# Patient Record
Sex: FEMALE | Race: WHITE | NOT HISPANIC OR LATINO
[De-identification: names, ages, dates, MRNs, and addresses within clinical notes are randomized per-mention and may not be internally consistent; named-entity substitution may affect disease eponyms.]

---

## 2017-05-25 ENCOUNTER — APPOINTMENT (OUTPATIENT)
Dept: PULMONOLOGY | Facility: CLINIC | Age: 27
End: 2017-05-25

## 2017-06-02 ENCOUNTER — APPOINTMENT (OUTPATIENT)
Dept: OBGYN | Facility: CLINIC | Age: 27
End: 2017-06-02

## 2018-05-30 ENCOUNTER — APPOINTMENT (OUTPATIENT)
Dept: OBGYN | Facility: CLINIC | Age: 28
End: 2018-05-30
Payer: COMMERCIAL

## 2018-05-30 PROCEDURE — 99395 PREV VISIT EST AGE 18-39: CPT

## 2019-06-06 ENCOUNTER — APPOINTMENT (OUTPATIENT)
Dept: OBGYN | Facility: CLINIC | Age: 29
End: 2019-06-06

## 2021-01-19 ENCOUNTER — FORM ENCOUNTER (OUTPATIENT)
Age: 31
End: 2021-01-19

## 2021-01-19 ENCOUNTER — RESULT REVIEW (OUTPATIENT)
Age: 31
End: 2021-01-19

## 2021-01-20 ENCOUNTER — APPOINTMENT (OUTPATIENT)
Dept: OBGYN | Facility: CLINIC | Age: 31
End: 2021-01-20
Payer: COMMERCIAL

## 2021-01-20 ENCOUNTER — FORM ENCOUNTER (OUTPATIENT)
Age: 31
End: 2021-01-20

## 2021-01-20 ENCOUNTER — TRANSCRIPTION ENCOUNTER (OUTPATIENT)
Age: 31
End: 2021-01-20

## 2021-01-20 PROCEDURE — 99395 PREV VISIT EST AGE 18-39: CPT

## 2021-01-20 PROCEDURE — 99213 OFFICE O/P EST LOW 20 MIN: CPT | Mod: 25

## 2021-01-20 PROCEDURE — 36415 COLL VENOUS BLD VENIPUNCTURE: CPT

## 2021-01-20 PROCEDURE — 76817 TRANSVAGINAL US OBSTETRIC: CPT

## 2021-01-26 ENCOUNTER — FORM ENCOUNTER (OUTPATIENT)
Age: 31
End: 2021-01-26

## 2021-02-09 ENCOUNTER — FORM ENCOUNTER (OUTPATIENT)
Age: 31
End: 2021-02-09

## 2021-02-16 ENCOUNTER — FORM ENCOUNTER (OUTPATIENT)
Age: 31
End: 2021-02-16

## 2021-02-17 ENCOUNTER — APPOINTMENT (OUTPATIENT)
Dept: OBGYN | Facility: CLINIC | Age: 31
End: 2021-02-17
Payer: COMMERCIAL

## 2021-02-17 PROCEDURE — 76813 OB US NUCHAL MEAS 1 GEST: CPT | Mod: 59

## 2021-02-17 PROCEDURE — 0500F INITIAL PRENATAL CARE VISIT: CPT

## 2021-02-17 PROCEDURE — 99072 ADDL SUPL MATRL&STAF TM PHE: CPT

## 2021-02-17 PROCEDURE — 76801 OB US < 14 WKS SINGLE FETUS: CPT

## 2021-02-25 ENCOUNTER — FORM ENCOUNTER (OUTPATIENT)
Age: 31
End: 2021-02-25

## 2021-03-17 ENCOUNTER — APPOINTMENT (OUTPATIENT)
Dept: OBGYN | Facility: CLINIC | Age: 31
End: 2021-03-17
Payer: COMMERCIAL

## 2021-03-17 PROCEDURE — 0502F SUBSEQUENT PRENATAL CARE: CPT

## 2021-03-17 PROCEDURE — 36415 COLL VENOUS BLD VENIPUNCTURE: CPT

## 2021-03-25 ENCOUNTER — FORM ENCOUNTER (OUTPATIENT)
Age: 31
End: 2021-03-25

## 2021-04-06 ENCOUNTER — NON-APPOINTMENT (OUTPATIENT)
Age: 31
End: 2021-04-06

## 2021-04-12 ENCOUNTER — ASOB RESULT (OUTPATIENT)
Age: 31
End: 2021-04-12

## 2021-04-12 ENCOUNTER — APPOINTMENT (OUTPATIENT)
Dept: ANTEPARTUM | Facility: CLINIC | Age: 31
End: 2021-04-12
Payer: COMMERCIAL

## 2021-04-12 PROCEDURE — 99072 ADDL SUPL MATRL&STAF TM PHE: CPT

## 2021-04-12 PROCEDURE — 76811 OB US DETAILED SNGL FETUS: CPT

## 2021-04-15 ENCOUNTER — APPOINTMENT (OUTPATIENT)
Dept: OBGYN | Facility: CLINIC | Age: 31
End: 2021-04-15
Payer: COMMERCIAL

## 2021-04-15 PROCEDURE — 0502F SUBSEQUENT PRENATAL CARE: CPT

## 2021-04-25 ENCOUNTER — FORM ENCOUNTER (OUTPATIENT)
Age: 31
End: 2021-04-25

## 2021-05-26 ENCOUNTER — APPOINTMENT (OUTPATIENT)
Dept: OBGYN | Facility: CLINIC | Age: 31
End: 2021-05-26
Payer: COMMERCIAL

## 2021-05-26 PROCEDURE — 0502F SUBSEQUENT PRENATAL CARE: CPT

## 2021-05-26 PROCEDURE — 36415 COLL VENOUS BLD VENIPUNCTURE: CPT

## 2021-05-26 PROCEDURE — 99072 ADDL SUPL MATRL&STAF TM PHE: CPT

## 2021-05-26 PROCEDURE — 76816 OB US FOLLOW-UP PER FETUS: CPT

## 2021-06-23 ENCOUNTER — APPOINTMENT (OUTPATIENT)
Dept: OBGYN | Facility: CLINIC | Age: 31
End: 2021-06-23
Payer: COMMERCIAL

## 2021-06-23 PROCEDURE — 0502F SUBSEQUENT PRENATAL CARE: CPT

## 2021-06-30 ENCOUNTER — OUTPATIENT (OUTPATIENT)
Dept: OUTPATIENT SERVICES | Facility: HOSPITAL | Age: 31
LOS: 1 days | End: 2021-06-30
Payer: COMMERCIAL

## 2021-06-30 ENCOUNTER — APPOINTMENT (OUTPATIENT)
Dept: OBGYN | Facility: CLINIC | Age: 31
End: 2021-06-30
Payer: COMMERCIAL

## 2021-06-30 VITALS
OXYGEN SATURATION: 96 % | SYSTOLIC BLOOD PRESSURE: 106 MMHG | TEMPERATURE: 99 F | RESPIRATION RATE: 96 BRPM | HEART RATE: 82 BPM | DIASTOLIC BLOOD PRESSURE: 65 MMHG

## 2021-06-30 VITALS — SYSTOLIC BLOOD PRESSURE: 105 MMHG | DIASTOLIC BLOOD PRESSURE: 61 MMHG | HEART RATE: 86 BPM | TEMPERATURE: 99 F

## 2021-06-30 DIAGNOSIS — Z3A.00 WEEKS OF GESTATION OF PREGNANCY NOT SPECIFIED: ICD-10-CM

## 2021-06-30 DIAGNOSIS — O26.899 OTHER SPECIFIED PREGNANCY RELATED CONDITIONS, UNSPECIFIED TRIMESTER: ICD-10-CM

## 2021-06-30 PROCEDURE — G0463: CPT

## 2021-06-30 PROCEDURE — 59025 FETAL NON-STRESS TEST: CPT | Mod: 26

## 2021-06-30 PROCEDURE — 0502F SUBSEQUENT PRENATAL CARE: CPT

## 2021-06-30 NOTE — OB PROVIDER TRIAGE NOTE - HISTORY OF PRESENT ILLNESS
R3 OB Triage Note     NELI LOWERY is a 30y/o  at 31w5d who presented for evaluation following 2D of decreased fetal movement and self recorded periods of tachycardia (up to 150) at home while at rest. Pt denies any LOF, VB, or ctx. She denies any fever/chills, n/v, SOB, HA, or RUQ pain.     course complications:  - Tachycardia: intermittent tachycardia up to 150 at home while at rest, pt referred to a cardiologist and will scheduled an appt tomorrow morning    OBGYNHx:  -   - Denies any fibroid/cysts/adenomyosis/endometriosis/STIs/pap     PMHx:   - Asthma: allergy & exercise induced, no inhaler use during pregnancy     SurgHx: denies    PSHx:  - Lives w/    - Denies Alcohol/Tobacco/Illicit drug use     Meds:     All: NKDA    VS:  T(C): 37.0 (21 @ 22:47), Max: 37 (21 @ 21:14)  HR: 86 (21 @ 22:47) (76 - 104)  BP: 105/61 (21 @ 22:47) (105/61 - 106/65)  RR: 96 (21 @ 21:14) (96 - 96)  SpO2: 97% (21 @ 22:46) (94% - 97%)    Physical Exam   GEN: well appearing, in no acute distress  Cards: RRR  Pulm: CTAB  Abd: gravid, nontender      EFH: Cat I, 140  [place]: no ctx  TAUS: transverse

## 2021-06-30 NOTE — OB PROVIDER TRIAGE NOTE - NSOBPROVIDERNOTE_OBGYN_ALL_OB_FT
NELI LOWERY is a 32y/o  at 31w5d who presented complaining of decreased fetal movement and self recorded periods of tachycardia while at rest. Pt denies any LOF, VB, or ctx. She denies any fever/chills, n/v, SOB, HA, or RUQ pain. ROS currently unremarkable VSS with HR wnl and BPP 8/8 with a Cat I NST. Pt was advised to follow up with her primary obgyn for her routine appts and cardiology for outpt workup of intermittent periods of asymptomatic tachycardia. Pt was given precautions fo when to return to care for evaluation (DFM, LOF, VB, or painful ctx, etc prn).    Pt discussed with Dr. Moscoso.    lEyse Cary MD  OBGYN PGY3

## 2021-07-13 ENCOUNTER — NON-APPOINTMENT (OUTPATIENT)
Age: 31
End: 2021-07-13

## 2021-07-13 ENCOUNTER — APPOINTMENT (OUTPATIENT)
Dept: CV DIAGNOSITCS | Facility: HOSPITAL | Age: 31
End: 2021-07-13

## 2021-07-13 ENCOUNTER — APPOINTMENT (OUTPATIENT)
Dept: CARDIOLOGY | Facility: CLINIC | Age: 31
End: 2021-07-13
Payer: COMMERCIAL

## 2021-07-13 ENCOUNTER — APPOINTMENT (OUTPATIENT)
Dept: ELECTROPHYSIOLOGY | Facility: CLINIC | Age: 31
End: 2021-07-13
Payer: COMMERCIAL

## 2021-07-13 ENCOUNTER — OUTPATIENT (OUTPATIENT)
Dept: OUTPATIENT SERVICES | Facility: HOSPITAL | Age: 31
LOS: 1 days | End: 2021-07-13
Payer: COMMERCIAL

## 2021-07-13 VITALS
OXYGEN SATURATION: 96 % | SYSTOLIC BLOOD PRESSURE: 107 MMHG | BODY MASS INDEX: 29.63 KG/M2 | WEIGHT: 161 LBS | HEART RATE: 69 BPM | DIASTOLIC BLOOD PRESSURE: 66 MMHG | HEIGHT: 62 IN

## 2021-07-13 DIAGNOSIS — Z00.00 ENCOUNTER FOR GENERAL ADULT MEDICAL EXAMINATION WITHOUT ABNORMAL FINDINGS: ICD-10-CM

## 2021-07-13 DIAGNOSIS — Z83.3 FAMILY HISTORY OF DIABETES MELLITUS: ICD-10-CM

## 2021-07-13 DIAGNOSIS — Z78.9 OTHER SPECIFIED HEALTH STATUS: ICD-10-CM

## 2021-07-13 PROCEDURE — 99072 ADDL SUPL MATRL&STAF TM PHE: CPT

## 2021-07-13 PROCEDURE — 99244 OFF/OP CNSLTJ NEW/EST MOD 40: CPT

## 2021-07-13 PROCEDURE — 93306 TTE W/DOPPLER COMPLETE: CPT

## 2021-07-13 PROCEDURE — 93000 ELECTROCARDIOGRAM COMPLETE: CPT

## 2021-07-13 PROCEDURE — 93242 EXT ECG>48HR<7D RECORDING: CPT

## 2021-07-13 PROCEDURE — 93306 TTE W/DOPPLER COMPLETE: CPT | Mod: 26

## 2021-07-13 RX ORDER — MONTELUKAST SODIUM 10 MG/1
10 TABLET, FILM COATED ORAL
Refills: 0 | Status: ACTIVE | COMMUNITY

## 2021-07-14 ENCOUNTER — APPOINTMENT (OUTPATIENT)
Dept: OBGYN | Facility: CLINIC | Age: 31
End: 2021-07-14
Payer: COMMERCIAL

## 2021-07-14 PROCEDURE — 76819 FETAL BIOPHYS PROFIL W/O NST: CPT

## 2021-07-14 PROCEDURE — 0502F SUBSEQUENT PRENATAL CARE: CPT

## 2021-07-14 PROCEDURE — 76816 OB US FOLLOW-UP PER FETUS: CPT

## 2021-07-14 PROCEDURE — 99072 ADDL SUPL MATRL&STAF TM PHE: CPT

## 2021-07-16 ENCOUNTER — FORM ENCOUNTER (OUTPATIENT)
Age: 31
End: 2021-07-16

## 2021-07-18 NOTE — REVIEW OF SYSTEMS
[Palpitations] : palpitations [Negative] : Heme/Lymph [SOB] : no shortness of breath [Dyspnea on exertion] : not dyspnea during exertion [Chest Discomfort] : no chest discomfort [Lower Ext Edema] : no extremity edema [Leg Claudication] : no intermittent leg claudication [Syncope] : no syncope

## 2021-07-18 NOTE — HISTORY OF PRESENT ILLNESS
[FreeTextEntry1] : Jesenia is a 31-year-old female 33 weeks first pregnancy with palpitations. She works as PT and happens more at work. Lasts a few seconds and resolves spontaneously. No lightheadedness or dizziness.

## 2021-07-18 NOTE — DISCUSSION/SUMMARY
[FreeTextEntry1] : The patient is a 31-year-old female 33 weeks gestation with palpitations unrelated to activity.\par - normal ECG\par - ECHO normal\par - event monitor placed\par - recommend hydration and rest when fatigued\par - does not appear to be related to meals

## 2021-07-28 ENCOUNTER — APPOINTMENT (OUTPATIENT)
Dept: OBGYN | Facility: CLINIC | Age: 31
End: 2021-07-28
Payer: COMMERCIAL

## 2021-07-28 PROCEDURE — 0502F SUBSEQUENT PRENATAL CARE: CPT

## 2021-08-05 ENCOUNTER — APPOINTMENT (OUTPATIENT)
Dept: OBGYN | Facility: CLINIC | Age: 31
End: 2021-08-05
Payer: COMMERCIAL

## 2021-08-05 ENCOUNTER — FORM ENCOUNTER (OUTPATIENT)
Age: 31
End: 2021-08-05

## 2021-08-05 PROCEDURE — 0502F SUBSEQUENT PRENATAL CARE: CPT

## 2021-08-08 PROCEDURE — 93244 EXT ECG>48HR<7D REV&INTERPJ: CPT

## 2021-08-12 ENCOUNTER — APPOINTMENT (OUTPATIENT)
Dept: OBGYN | Facility: CLINIC | Age: 31
End: 2021-08-12
Payer: COMMERCIAL

## 2021-08-12 PROCEDURE — 0502F SUBSEQUENT PRENATAL CARE: CPT

## 2021-08-17 ENCOUNTER — FORM ENCOUNTER (OUTPATIENT)
Age: 31
End: 2021-08-17

## 2021-08-18 ENCOUNTER — APPOINTMENT (OUTPATIENT)
Dept: OBGYN | Facility: CLINIC | Age: 31
End: 2021-08-18
Payer: COMMERCIAL

## 2021-08-18 PROCEDURE — 0502F SUBSEQUENT PRENATAL CARE: CPT

## 2021-08-24 ENCOUNTER — FORM ENCOUNTER (OUTPATIENT)
Age: 31
End: 2021-08-24

## 2021-08-25 ENCOUNTER — APPOINTMENT (OUTPATIENT)
Dept: OBGYN | Facility: CLINIC | Age: 31
End: 2021-08-25
Payer: COMMERCIAL

## 2021-08-25 PROCEDURE — 0502F SUBSEQUENT PRENATAL CARE: CPT

## 2021-08-25 PROCEDURE — 76818 FETAL BIOPHYS PROFILE W/NST: CPT

## 2021-08-25 PROCEDURE — 76816 OB US FOLLOW-UP PER FETUS: CPT

## 2021-08-31 ENCOUNTER — OUTPATIENT (OUTPATIENT)
Dept: OUTPATIENT SERVICES | Facility: HOSPITAL | Age: 31
LOS: 1 days | End: 2021-08-31
Payer: COMMERCIAL

## 2021-08-31 ENCOUNTER — FORM ENCOUNTER (OUTPATIENT)
Age: 31
End: 2021-08-31

## 2021-08-31 DIAGNOSIS — Z11.52 ENCOUNTER FOR SCREENING FOR COVID-19: ICD-10-CM

## 2021-08-31 LAB — SARS-COV-2 RNA SPEC QL NAA+PROBE: SIGNIFICANT CHANGE UP

## 2021-08-31 PROCEDURE — C9803: CPT

## 2021-08-31 PROCEDURE — U0005: CPT

## 2021-08-31 PROCEDURE — U0003: CPT

## 2021-09-02 ENCOUNTER — INPATIENT (INPATIENT)
Facility: HOSPITAL | Age: 31
LOS: 1 days | Discharge: HOME | End: 2021-09-04
Attending: OBSTETRICS & GYNECOLOGY | Admitting: OBSTETRICS & GYNECOLOGY
Payer: COMMERCIAL

## 2021-09-02 VITALS — HEIGHT: 62 IN

## 2021-09-02 DIAGNOSIS — O48.0 POST-TERM PREGNANCY: ICD-10-CM

## 2021-09-02 LAB
BASOPHILS # BLD AUTO: 0.03 K/UL — SIGNIFICANT CHANGE UP (ref 0–0.2)
BASOPHILS NFR BLD AUTO: 0.3 % — SIGNIFICANT CHANGE UP (ref 0–2)
BLD GP AB SCN SERPL QL: NEGATIVE — SIGNIFICANT CHANGE UP
EOSINOPHIL # BLD AUTO: 0.11 K/UL — SIGNIFICANT CHANGE UP (ref 0–0.5)
EOSINOPHIL NFR BLD AUTO: 1.1 % — SIGNIFICANT CHANGE UP (ref 0–6)
HCT VFR BLD CALC: 36.4 % — SIGNIFICANT CHANGE UP (ref 34.5–45)
HGB BLD-MCNC: 12.1 G/DL — SIGNIFICANT CHANGE UP (ref 11.5–15.5)
IMM GRANULOCYTES NFR BLD AUTO: 0.7 % — SIGNIFICANT CHANGE UP (ref 0–1.5)
LYMPHOCYTES # BLD AUTO: 2.08 K/UL — SIGNIFICANT CHANGE UP (ref 1–3.3)
LYMPHOCYTES # BLD AUTO: 20.2 % — SIGNIFICANT CHANGE UP (ref 13–44)
MCHC RBC-ENTMCNC: 31.3 PG — SIGNIFICANT CHANGE UP (ref 27–34)
MCHC RBC-ENTMCNC: 33.2 GM/DL — SIGNIFICANT CHANGE UP (ref 32–36)
MCV RBC AUTO: 94.3 FL — SIGNIFICANT CHANGE UP (ref 80–100)
MONOCYTES # BLD AUTO: 0.81 K/UL — SIGNIFICANT CHANGE UP (ref 0–0.9)
MONOCYTES NFR BLD AUTO: 7.9 % — SIGNIFICANT CHANGE UP (ref 2–14)
NEUTROPHILS # BLD AUTO: 7.21 K/UL — SIGNIFICANT CHANGE UP (ref 1.8–7.4)
NEUTROPHILS NFR BLD AUTO: 69.8 % — SIGNIFICANT CHANGE UP (ref 43–77)
NRBC # BLD: 0 /100 WBCS — SIGNIFICANT CHANGE UP (ref 0–0)
PLATELET # BLD AUTO: 165 K/UL — SIGNIFICANT CHANGE UP (ref 150–400)
RBC # BLD: 3.86 M/UL — SIGNIFICANT CHANGE UP (ref 3.8–5.2)
RBC # FLD: 13.6 % — SIGNIFICANT CHANGE UP (ref 10.3–14.5)
RH IG SCN BLD-IMP: POSITIVE — SIGNIFICANT CHANGE UP
RH IG SCN BLD-IMP: POSITIVE — SIGNIFICANT CHANGE UP
WBC # BLD: 10.31 K/UL — SIGNIFICANT CHANGE UP (ref 3.8–10.5)
WBC # FLD AUTO: 10.31 K/UL — SIGNIFICANT CHANGE UP (ref 3.8–10.5)

## 2021-09-02 RX ORDER — SODIUM CHLORIDE 9 MG/ML
1000 INJECTION, SOLUTION INTRAVENOUS
Refills: 0 | Status: DISCONTINUED | OUTPATIENT
Start: 2021-09-02 | End: 2021-09-03

## 2021-09-02 RX ORDER — CITRIC ACID/SODIUM CITRATE 300-500 MG
15 SOLUTION, ORAL ORAL EVERY 6 HOURS
Refills: 0 | Status: DISCONTINUED | OUTPATIENT
Start: 2021-09-02 | End: 2021-09-03

## 2021-09-02 RX ORDER — INFLUENZA VIRUS VACCINE 15; 15; 15; 15 UG/.5ML; UG/.5ML; UG/.5ML; UG/.5ML
0.5 SUSPENSION INTRAMUSCULAR ONCE
Refills: 0 | Status: DISCONTINUED | OUTPATIENT
Start: 2021-09-02 | End: 2021-09-04

## 2021-09-02 RX ORDER — OXYTOCIN 10 UNIT/ML
333.33 VIAL (ML) INJECTION
Qty: 20 | Refills: 0 | Status: DISCONTINUED | OUTPATIENT
Start: 2021-09-02 | End: 2021-09-04

## 2021-09-02 RX ADMIN — SODIUM CHLORIDE 125 MILLILITER(S): 9 INJECTION, SOLUTION INTRAVENOUS at 22:55

## 2021-09-02 NOTE — OB PROVIDER IHI INDUCTION/AUGMENTATION NOTE - NS_IHIMETHOD_OBGYN_ALL_OB
Cytotec Vaginal/CRB, Cervical Ripening Balloon Pitocin/Cytotec Vaginal/CRB, Cervical Ripening Balloon

## 2021-09-02 NOTE — OB PROVIDER H&P - ASSESSMENT
30yo  with uncomplicated PNC here for IOL for post dates. Will admit to L&D for vaginal cytotec and cervical balloon.    Plan  - Admit to LND. Routine Labs. IVF.  - IOL w/ VC and CB  - Fetus: cat 1 tracing. VTX. EFW g by sono. Continuous EFM. No concerns.  - Prenatal issues: none  - GBS neg, abx not required  - Pain: epidural PRN    Patient discussed with attending physician, Dr. Key.      PATTY Enamorado, PGY1 32yo  with uncomplicated PNC here for IOL for post dates. Will admit to L&D for vaginal cytotec and cervical balloon.    Plan  - Admit to LND. Routine Labs. IVF.  - IOL w/ VC and CB  - Fetus: cat 1 tracing. VTX. EFW extrapolated to 3432g. Continuous EFM. No concerns.  - Prenatal issues: none  - GBS neg, abx not required  - Pain: epidural PRN    Patient discussed with attending physician, Dr. Key.      PATTY Enamorado, PGY1

## 2021-09-02 NOTE — OB PROVIDER H&P - NSHPLABSRESULTS_GEN_ALL_CORE
T(C): 36.6 (09-02-21 @ 21:50), Max: 36.6 (09-02-21 @ 21:44)  HR: 74 (09-02-21 @ 22:49) (70 - 94)  BP: 113/65 (09-02-21 @ 21:50) (113/65 - 113/65)  RR: 18 (09-02-21 @ 21:50) (18 - 18)  SpO2: 99% (09-02-21 @ 22:49) (99% - 100%)

## 2021-09-02 NOTE — OB PROVIDER H&P - HISTORY OF PRESENT ILLNESS
HPI: 32yo F  @40+6  presents for IOL for post dates. Patient reports cramping but not discrete contractions. +FM. -LOF. -VB. Pt denies any other concerns.    PNC: Denies prenatal issues. GBS neg.  EFW 3232g by sono last week.    OBHx: primigravida  GynHx: denies hx STIs, fibroids, polyps, cysts  PMH: Asthma-never hospitalized, intubated, or on steroids. Denies hx clotting or bleeding disorders, HTN, DM  PSH: denies  PFH: no hx congential disorders, bleeding/clotting disorders  Psych: denies   Social: denies etoh, smoking, drugs. Safe at home/in relationship.  Meds: PNV, Montelukast 10mg, albuterol PRN-last used before pregnancy  Allergies: NKDA  Will accept blood transfusions? Yes

## 2021-09-02 NOTE — PRE-ANESTHESIA EVALUATION ADULT - NSANTHPMHFT_GEN_ALL_CORE
39 weeks gestation; tachycardia ; palpitations;  asthma; cardiology consult 7/2021: nl EKG and nl echo 40.6 weeks gestation; tachycardia ; palpitations;  asthma; cardiology consult 7/2021: nl EKG and nl echo

## 2021-09-02 NOTE — OB PROVIDER H&P - NSHPPHYSICALEXAM_GEN_ALL_CORE
Gen: NAD  CV: RRR  Pulm: breathing comfortably on RA  Abd: gravid, nontender  Extr: moving all extremities with ease  – VE: 2/70/-3  – FHT Cat I: baseline 135, mod variability, +accels, -decels  – Rand: Irregular  – Sono: vertex

## 2021-09-03 LAB
COVID-19 SPIKE DOMAIN AB INTERP: NEGATIVE — SIGNIFICANT CHANGE UP
COVID-19 SPIKE DOMAIN ANTIBODY RESULT: 0.4 U/ML — SIGNIFICANT CHANGE UP
SARS-COV-2 IGG+IGM SERPL QL IA: 0.4 U/ML — SIGNIFICANT CHANGE UP
SARS-COV-2 IGG+IGM SERPL QL IA: NEGATIVE — SIGNIFICANT CHANGE UP
T PALLIDUM AB TITR SER: NEGATIVE — SIGNIFICANT CHANGE UP

## 2021-09-03 PROCEDURE — 59400 OBSTETRICAL CARE: CPT

## 2021-09-03 RX ORDER — BENZOCAINE 10 %
1 GEL (GRAM) MUCOUS MEMBRANE EVERY 6 HOURS
Refills: 0 | Status: DISCONTINUED | OUTPATIENT
Start: 2021-09-03 | End: 2021-09-04

## 2021-09-03 RX ORDER — OXYTOCIN 10 UNIT/ML
4 VIAL (ML) INJECTION
Qty: 30 | Refills: 0 | Status: DISCONTINUED | OUTPATIENT
Start: 2021-09-03 | End: 2021-09-03

## 2021-09-03 RX ORDER — SODIUM CHLORIDE 9 MG/ML
3 INJECTION INTRAMUSCULAR; INTRAVENOUS; SUBCUTANEOUS EVERY 8 HOURS
Refills: 0 | Status: DISCONTINUED | OUTPATIENT
Start: 2021-09-03 | End: 2021-09-04

## 2021-09-03 RX ORDER — SIMETHICONE 80 MG/1
80 TABLET, CHEWABLE ORAL EVERY 4 HOURS
Refills: 0 | Status: DISCONTINUED | OUTPATIENT
Start: 2021-09-03 | End: 2021-09-04

## 2021-09-03 RX ORDER — OXYCODONE HYDROCHLORIDE 5 MG/1
5 TABLET ORAL
Refills: 0 | Status: DISCONTINUED | OUTPATIENT
Start: 2021-09-03 | End: 2021-09-04

## 2021-09-03 RX ORDER — DIPHENHYDRAMINE HCL 50 MG
25 CAPSULE ORAL EVERY 6 HOURS
Refills: 0 | Status: DISCONTINUED | OUTPATIENT
Start: 2021-09-03 | End: 2021-09-04

## 2021-09-03 RX ORDER — HYDROCORTISONE 1 %
1 OINTMENT (GRAM) TOPICAL EVERY 6 HOURS
Refills: 0 | Status: DISCONTINUED | OUTPATIENT
Start: 2021-09-03 | End: 2021-09-04

## 2021-09-03 RX ORDER — KETOROLAC TROMETHAMINE 30 MG/ML
30 SYRINGE (ML) INJECTION ONCE
Refills: 0 | Status: DISCONTINUED | OUTPATIENT
Start: 2021-09-03 | End: 2021-09-03

## 2021-09-03 RX ORDER — TETANUS TOXOID, REDUCED DIPHTHERIA TOXOID AND ACELLULAR PERTUSSIS VACCINE, ADSORBED 5; 2.5; 8; 8; 2.5 [IU]/.5ML; [IU]/.5ML; UG/.5ML; UG/.5ML; UG/.5ML
0.5 SUSPENSION INTRAMUSCULAR ONCE
Refills: 0 | Status: DISCONTINUED | OUTPATIENT
Start: 2021-09-03 | End: 2021-09-04

## 2021-09-03 RX ORDER — OXYTOCIN 10 UNIT/ML
333.33 VIAL (ML) INJECTION
Qty: 20 | Refills: 0 | Status: DISCONTINUED | OUTPATIENT
Start: 2021-09-03 | End: 2021-09-04

## 2021-09-03 RX ORDER — OXYCODONE HYDROCHLORIDE 5 MG/1
5 TABLET ORAL ONCE
Refills: 0 | Status: DISCONTINUED | OUTPATIENT
Start: 2021-09-03 | End: 2021-09-04

## 2021-09-03 RX ORDER — PRAMOXINE HYDROCHLORIDE 150 MG/15G
1 AEROSOL, FOAM RECTAL EVERY 4 HOURS
Refills: 0 | Status: DISCONTINUED | OUTPATIENT
Start: 2021-09-03 | End: 2021-09-04

## 2021-09-03 RX ORDER — AER TRAVELER 0.5 G/1
1 SOLUTION RECTAL; TOPICAL EVERY 4 HOURS
Refills: 0 | Status: DISCONTINUED | OUTPATIENT
Start: 2021-09-03 | End: 2021-09-04

## 2021-09-03 RX ORDER — MAGNESIUM HYDROXIDE 400 MG/1
30 TABLET, CHEWABLE ORAL
Refills: 0 | Status: DISCONTINUED | OUTPATIENT
Start: 2021-09-03 | End: 2021-09-04

## 2021-09-03 RX ORDER — LANOLIN
1 OINTMENT (GRAM) TOPICAL EVERY 6 HOURS
Refills: 0 | Status: DISCONTINUED | OUTPATIENT
Start: 2021-09-03 | End: 2021-09-04

## 2021-09-03 RX ORDER — OXYTOCIN 10 UNIT/ML
2 VIAL (ML) INJECTION
Qty: 30 | Refills: 0 | Status: DISCONTINUED | OUTPATIENT
Start: 2021-09-03 | End: 2021-09-04

## 2021-09-03 RX ORDER — DIBUCAINE 1 %
1 OINTMENT (GRAM) RECTAL EVERY 6 HOURS
Refills: 0 | Status: DISCONTINUED | OUTPATIENT
Start: 2021-09-03 | End: 2021-09-04

## 2021-09-03 RX ORDER — DIPHENHYDRAMINE HCL 50 MG
25 CAPSULE ORAL EVERY 6 HOURS
Refills: 0 | Status: COMPLETED | OUTPATIENT
Start: 2021-09-03 | End: 2022-08-02

## 2021-09-03 RX ORDER — IBUPROFEN 200 MG
600 TABLET ORAL EVERY 6 HOURS
Refills: 0 | Status: COMPLETED | OUTPATIENT
Start: 2021-09-03 | End: 2022-08-02

## 2021-09-03 RX ORDER — IBUPROFEN 200 MG
600 TABLET ORAL EVERY 6 HOURS
Refills: 0 | Status: DISCONTINUED | OUTPATIENT
Start: 2021-09-03 | End: 2021-09-04

## 2021-09-03 RX ORDER — ACETAMINOPHEN 500 MG
975 TABLET ORAL
Refills: 0 | Status: DISCONTINUED | OUTPATIENT
Start: 2021-09-03 | End: 2021-09-04

## 2021-09-03 RX ADMIN — Medication 30 MILLIGRAM(S): at 14:57

## 2021-09-03 RX ADMIN — Medication 975 MILLIGRAM(S): at 20:45

## 2021-09-03 RX ADMIN — Medication 975 MILLIGRAM(S): at 21:15

## 2021-09-03 NOTE — PRE-ANESTHESIA EVALUATION ADULT - NSANTHOSAYNRD_GEN_A_CORE
No. SUZIE screening performed.  STOP BANG Legend: 0-2 = LOW Risk; 3-4 = INTERMEDIATE Risk; 5-8 = HIGH Risk
No. SUZIE screening performed.  STOP BANG Legend: 0-2 = LOW Risk; 3-4 = INTERMEDIATE Risk; 5-8 = HIGH Risk

## 2021-09-03 NOTE — OB PROVIDER LABOR PROGRESS NOTE - NS_SUBJECTIVE/OBJECTIVE_OBGYN_ALL_OB_FT
OB PA Progress Note    Pt seen and evaluated for AROM.   AROM, clear fluid.    Exam  VSS  SVE 9/100/0  EFM 130bpm, moderate variability, +accels, occasional variable/late decelt  [place]: Q2-4min
AT bedside for VE and to place vaginal cytotec. She is s/p epidural. CB still in place during examination. Vaginal cytotec placed without complication
pt seen for placement of vaginal cytotec
patient reevaluated, occ lates noted on exam, maternal position changed and FHR resolved
c/o rectal pressure
Patient comfortable. VC#1 and CB placed with RN in room.

## 2021-09-03 NOTE — OB RN DELIVERY SUMMARY - NS_SEPSISRSKCALC_OBGYN_ALL_OB_FT
EOS calculated successfully. EOS Risk Factor: 0.5/1000 live births ([facility] national incidence); GA=41w;Temp=98.6; ROM=1.317; GBS='Negative'; Antibiotics='No antibiotics or any antibiotics < 2 hrs prior to birth'

## 2021-09-03 NOTE — PRE-ANESTHESIA EVALUATION ADULT - NSANTHPMHFT_GEN_ALL_CORE
30yo F  @40+6  presents for IOL for post dates. Patient reports cramping but not discrete contractions. +FM. -LOF. -VB. Pt denies any other concerns.

## 2021-09-03 NOTE — OB PROVIDER LABOR PROGRESS NOTE - NS_OBIHIFHRDETAILS_OBGYN_ALL_OB_FT
baseline 130, mod scot, +accels, no decels
125, mod scot, +accels, no decels
145, mod varaibility, +acels
Cat I: 130/mod variability/+ accels/- decels
cat 1 tracing

## 2021-09-03 NOTE — OB PROVIDER LABOR PROGRESS NOTE - ASSESSMENT
-cervical balloon in place  -vaginal cytotec #2 placed  -pt comfortable  -category 1 tracing    MGreenman PGY3
32yo  @40+6 here for IOL for LT s/p VC#1 and CB.    - FHT Cat I, continue to monitor  - Expect     PATTY Enamorado, PGY1
expect   will push with urge    Magalys Keith MD
A/P:  - anticipate   - EFM: Cat II, moderate variability, overall reassuring  - For pitocin augmentation PRN  - Dr. Keith in house, aware    Paulina Arzate PA-C
LT IOL with reassuring fetal status    -c/w CB  -pitocin in 3hours    D/w Dr. Sagar Saenz PGY-4
P0 IOL for PD, reevaluated, CB removed, will cont to monitor will start low dose pitocin if ctx space out  discussed risks of IOL, vaginal delivery, operative delivery, possible cesearan section, all questions answered, willing to accept blood transfusion    Magalys Keith MD
No

## 2021-09-03 NOTE — OB RN DELIVERY SUMMARY - NSSELHIDDEN_OBGYN_ALL_OB_FT
[NS_DeliveryAttending1_OBGYN_ALL_OB_FT:[email or web address]=],[NS_DeliveryRN_OBGYN_ALL_OB_FT:[id number]==]

## 2021-09-04 ENCOUNTER — TRANSCRIPTION ENCOUNTER (OUTPATIENT)
Age: 31
End: 2021-09-04

## 2021-09-04 VITALS
OXYGEN SATURATION: 97 % | TEMPERATURE: 98 F | SYSTOLIC BLOOD PRESSURE: 101 MMHG | RESPIRATION RATE: 18 BRPM | DIASTOLIC BLOOD PRESSURE: 63 MMHG | HEART RATE: 92 BPM

## 2021-09-04 PROCEDURE — 59025 FETAL NON-STRESS TEST: CPT

## 2021-09-04 PROCEDURE — 59050 FETAL MONITOR W/REPORT: CPT

## 2021-09-04 PROCEDURE — 86769 SARS-COV-2 COVID-19 ANTIBODY: CPT

## 2021-09-04 PROCEDURE — 86850 RBC ANTIBODY SCREEN: CPT

## 2021-09-04 PROCEDURE — 85025 COMPLETE CBC W/AUTO DIFF WBC: CPT

## 2021-09-04 PROCEDURE — 86900 BLOOD TYPING SEROLOGIC ABO: CPT

## 2021-09-04 PROCEDURE — 86901 BLOOD TYPING SEROLOGIC RH(D): CPT

## 2021-09-04 PROCEDURE — 86780 TREPONEMA PALLIDUM: CPT

## 2021-09-04 RX ORDER — IBUPROFEN 200 MG
1 TABLET ORAL
Qty: 0 | Refills: 0 | DISCHARGE
Start: 2021-09-04

## 2021-09-04 RX ORDER — ACETAMINOPHEN 500 MG
3 TABLET ORAL
Qty: 0 | Refills: 0 | DISCHARGE
Start: 2021-09-04

## 2021-09-04 RX ORDER — ALBUTEROL 90 UG/1
2 AEROSOL, METERED ORAL EVERY 6 HOURS
Refills: 0 | Status: DISCONTINUED | OUTPATIENT
Start: 2021-09-04 | End: 2021-09-04

## 2021-09-04 RX ADMIN — Medication 975 MILLIGRAM(S): at 19:00

## 2021-09-04 RX ADMIN — Medication 600 MILLIGRAM(S): at 08:30

## 2021-09-04 RX ADMIN — Medication 975 MILLIGRAM(S): at 18:20

## 2021-09-04 RX ADMIN — Medication 975 MILLIGRAM(S): at 03:03

## 2021-09-04 RX ADMIN — Medication 600 MILLIGRAM(S): at 15:45

## 2021-09-04 RX ADMIN — Medication 600 MILLIGRAM(S): at 07:52

## 2021-09-04 RX ADMIN — Medication 600 MILLIGRAM(S): at 15:09

## 2021-09-04 RX ADMIN — Medication 1 TABLET(S): at 12:06

## 2021-09-04 NOTE — DISCHARGE NOTE OB - CARE PLAN
Principal Discharge DX:	Vaginal delivery  Assessment and plan of treatment:	Make your follow-up appointment with your doctor as ordered.   No heavy lifting, driving, or strenuous activity for 6 weeks.  Nothing per vagina such as tampons, intercourse, douches or tub baths for 6 weeks or until you see your doctor.  Call your doctor if you're unable to tolerate food, increase in vaginal bleeding, or have difficulty urinating. Call your doctor if you have pain that is not relieved by your perscribed medications. Notify your doctor with any other concerns.   1

## 2021-09-04 NOTE — PROGRESS NOTE ADULT - SUBJECTIVE AND OBJECTIVE BOX
OB Progress Note:  PPD#1    S: 30yo PPD#1 s/p . Patient feels well. Pain is well controlled. She is tolerating a regular diet and passing flatus. She is voiding spontaneously, and ambulating without difficulty. Denies CP/SOB. Denies HA/lightheadedness/dizziness. Denies N/V. Denies calf pain.    O:  Vitals:  Vital Signs Last 24 Hrs  T(C): 36.7 (04 Sep 2021 05:48), Max: 37 (03 Sep 2021 12:21)  T(F): 98.1 (04 Sep 2021 05:48), Max: 98.6 (03 Sep 2021 12:21)  HR: 77 (04 Sep 2021 05:48) (61 - 149)  BP: 115/75 (04 Sep 2021 05:48) (93/50 - 162/84)  BP(mean): --  RR: 18 (04 Sep 2021 05:48) (17 - 19)  SpO2: 97% (04 Sep 2021 05:48) (68% - 100%)    MEDICATIONS  (STANDING):  acetaminophen   Tablet .. 975 milliGRAM(s) Oral <User Schedule>  diphtheria/tetanus/pertussis (acellular) Vaccine (ADAcel) 0.5 milliLiter(s) IntraMuscular once  ibuprofen  Tablet. 600 milliGRAM(s) Oral every 6 hours  influenza   Vaccine 0.5 milliLiter(s) IntraMuscular once  oxytocin Infusion 333.333 milliUNIT(s)/Min (1000 mL/Hr) IV Continuous <Continuous>  oxytocin Infusion 333.333 milliUNIT(s)/Min (1000 mL/Hr) IV Continuous <Continuous>  oxytocin Infusion. 2 milliUNIT(s)/Min (2 mL/Hr) IV Continuous <Continuous>  prenatal multivitamin 1 Tablet(s) Oral daily  sodium chloride 0.9% lock flush 3 milliLiter(s) IV Push every 8 hours      Labs:  Blood type: B Positive  Rubella IgG: RPR: Negative                          12.1   10.31 >-----------< 165    (  @ 23:12 )             36.4          Physical Exam:  General: NAD  CV: RRR  Pulm: CTAB  Abdomen: soft, non-tender, non-distended, fundus firm below umbilicus  Vaginal: lochia wnl, exam deferred  Extremities: no erythema/calf tenderness    PGY1

## 2021-09-04 NOTE — DISCHARGE NOTE OB - PATIENT PORTAL LINK FT
You can access the FollowMyHealth Patient Portal offered by [facility] by registering at the following website: [email or web address]. By joining [employer]’s FollowMyHealth portal, you will also be able to view your health information using other applications (apps) compatible with our system.

## 2021-09-04 NOTE — DISCHARGE NOTE OB - PLAN OF CARE
Make your follow-up appointment with your doctor as ordered.   No heavy lifting, driving, or strenuous activity for 6 weeks.  Nothing per vagina such as tampons, intercourse, douches or tub baths for 6 weeks or until you see your doctor.  Call your doctor if you're unable to tolerate food, increase in vaginal bleeding, or have difficulty urinating. Call your doctor if you have pain that is not relieved by your perscribed medications. Notify your doctor with any other concerns.

## 2021-09-04 NOTE — DISCHARGE NOTE OB - CARE PROVIDER_API CALL
Luba Key)  OBSN  General [id number] [address]  Phone: [phone]  Fax: [phone]  Follow Up Time:

## 2021-09-04 NOTE — PROGRESS NOTE ADULT - ATTENDING COMMENTS
Patient seen at bedside, agree with above assessment ,denies any complaints. discharge planning tonight.  To follow up in office in 6 weeks.     Magalys Keith MD

## 2021-09-04 NOTE — DISCHARGE NOTE OB - HOSPITAL COURSE
Pt admitted, s/p , uncomplicated. Postpartum course uncomplicated, discharged home in stable condition.

## 2021-09-04 NOTE — PROGRESS NOTE ADULT - ASSESSMENT
A/P: 30yo PPD#1 s/p .  Patient is stable and doing well post-partum.   - Pain well controlled, continue current pain regimen  - Increase ambulation, SCDs when not ambulating  - Continue regular diet    PATTY Enamorado

## 2021-09-09 ENCOUNTER — NON-APPOINTMENT (OUTPATIENT)
Age: 31
End: 2021-09-09

## 2021-09-13 PROBLEM — J45.909 UNSPECIFIED ASTHMA, UNCOMPLICATED: Chronic | Status: ACTIVE | Noted: 2021-09-02

## 2021-09-30 ENCOUNTER — FORM ENCOUNTER (OUTPATIENT)
Age: 31
End: 2021-09-30

## 2021-10-07 ENCOUNTER — FORM ENCOUNTER (OUTPATIENT)
Age: 31
End: 2021-10-07

## 2021-10-14 ENCOUNTER — FORM ENCOUNTER (OUTPATIENT)
Age: 31
End: 2021-10-14

## 2021-10-15 ENCOUNTER — APPOINTMENT (OUTPATIENT)
Dept: OBGYN | Facility: CLINIC | Age: 31
End: 2021-10-15
Payer: COMMERCIAL

## 2021-10-15 ENCOUNTER — FORM ENCOUNTER (OUTPATIENT)
Age: 31
End: 2021-10-15

## 2021-10-15 ENCOUNTER — LABORATORY RESULT (OUTPATIENT)
Age: 31
End: 2021-10-15

## 2021-10-15 ENCOUNTER — RESULT REVIEW (OUTPATIENT)
Age: 31
End: 2021-10-15

## 2021-10-15 PROCEDURE — 0503F POSTPARTUM CARE VISIT: CPT

## 2021-10-29 ENCOUNTER — APPOINTMENT (OUTPATIENT)
Dept: OBGYN | Facility: CLINIC | Age: 31
End: 2021-10-29
Payer: COMMERCIAL

## 2021-10-29 PROCEDURE — 0503F POSTPARTUM CARE VISIT: CPT

## 2021-11-01 ENCOUNTER — NON-APPOINTMENT (OUTPATIENT)
Age: 31
End: 2021-11-01

## 2021-11-09 ENCOUNTER — NON-APPOINTMENT (OUTPATIENT)
Age: 31
End: 2021-11-09

## 2021-11-17 ENCOUNTER — NON-APPOINTMENT (OUTPATIENT)
Age: 31
End: 2021-11-17

## 2021-11-22 DIAGNOSIS — Z80.0 FAMILY HISTORY OF MALIGNANT NEOPLASM OF DIGESTIVE ORGANS: ICD-10-CM

## 2021-11-22 DIAGNOSIS — Z87.42 PERSONAL HISTORY OF OTHER DISEASES OF THE FEMALE GENITAL TRACT: ICD-10-CM

## 2021-11-22 DIAGNOSIS — Z12.72 ENCOUNTER FOR SCREENING FOR MALIGNANT NEOPLASM OF VAGINA: ICD-10-CM

## 2021-11-22 DIAGNOSIS — L91.8 OTHER HYPERTROPHIC DISORDERS OF THE SKIN: ICD-10-CM

## 2021-12-01 ENCOUNTER — OUTPATIENT (OUTPATIENT)
Dept: OUTPATIENT SERVICES | Facility: HOSPITAL | Age: 31
LOS: 1 days | End: 2021-12-01
Payer: COMMERCIAL

## 2021-12-01 ENCOUNTER — APPOINTMENT (OUTPATIENT)
Dept: ULTRASOUND IMAGING | Facility: CLINIC | Age: 31
End: 2021-12-01
Payer: COMMERCIAL

## 2021-12-01 DIAGNOSIS — Z00.8 ENCOUNTER FOR OTHER GENERAL EXAMINATION: ICD-10-CM

## 2021-12-01 DIAGNOSIS — N92.6 IRREGULAR MENSTRUATION, UNSPECIFIED: ICD-10-CM

## 2021-12-01 PROCEDURE — 76856 US EXAM PELVIC COMPLETE: CPT

## 2021-12-01 PROCEDURE — 76830 TRANSVAGINAL US NON-OB: CPT

## 2021-12-01 PROCEDURE — 76856 US EXAM PELVIC COMPLETE: CPT | Mod: 26

## 2021-12-01 PROCEDURE — 76830 TRANSVAGINAL US NON-OB: CPT | Mod: 26

## 2021-12-02 ENCOUNTER — TRANSCRIPTION ENCOUNTER (OUTPATIENT)
Age: 31
End: 2021-12-02

## 2021-12-03 ENCOUNTER — TRANSCRIPTION ENCOUNTER (OUTPATIENT)
Age: 31
End: 2021-12-03

## 2021-12-13 DIAGNOSIS — Z30.9 ENCOUNTER FOR CONTRACEPTIVE MANAGEMENT, UNSPECIFIED: ICD-10-CM

## 2022-01-06 ENCOUNTER — NON-APPOINTMENT (OUTPATIENT)
Age: 32
End: 2022-01-06

## 2022-01-07 NOTE — OB PROVIDER H&P - NS_PRENATALLABSOURCEGBS36PN_OBGYN_ALL_OB
COVID Follow-up Call    Situation: This patient triggered as a low risk positive COVID-19 after a recent clinical encounter. A care transitions call occurred and is summarized below.    Background: Patient was admitted to [facility] from 1/2 - 1/6 for:    - acute hypoxic respiratory failure.    - COVID-19 associated pneumonia.    - hyponatremia     From Discharge Summary:     Hospital Course      Cisco Lacey is a 59 year old male who presented on 1/2/2022 with complaints of Shortness of Breath  COVID-19 screening postop, multifocal infiltrates on chest x-ray  1/3/2021:  On 1 L nasal cannula risk, desaturates to below 88% on ambulation, improved cough  1/4/2021:  On 1 L nasal cannula, desaturates on room air below 88%  1/5/2022:  Weaned to room air, saturating well intimally wit recurrent hypoxia required 1L O2 supplementation  1/6/2021:  Completed course of Decadron and remdesivir therapy, home O2 evaluation performed, required 3 L nasal cannula on ambulation, home health services established     Patient was admitted and managed for:  - acute hypoxic respiratory failure.    As evidenced with episodes of desaturating blood 88% on ambulation and shortness of breath.    Secondary to underlying pulmonary infectious process with COVID-19   To continue 3 L nasal cannula on ambulation as per home O2 evaluation  Continued inhaled bronchodilators  Encouraged incentive spirometer and self pronation  - COVID-19 associated pneumonia.    Associated transaminitis, no lymphopenia  Given associated hypoxia, started on Decadron and remdesivir protocol, completed 5 days course   To continue oral prednisone tapering dose on discharge  Continued oxygen supplementation     Continue contact and droplet precaution for 5 more days on discharge    To continue Eliquis 2.5 mg every 12 hours for 2 weeks for DVT prophylaxis on discharge  Continued supportive measures with zinc, ascorbic acid and vitamin-D.    - hyponatremia   Likely  hypoosmolar hyponatremia secondary to SIADH with pulmonaey disease.    Improving with restriction of oral fluid intake, sodium 134 on discharge  No acute metabolic or neurologic illness      Assessment:   Covid-19 Symptom Assessment  Covid-19 Case Type: Positive Covid-19 Test ([facility])  Date of Covid-19 Symptom Onset: 12/24/21  Current Symptoms: Shortness of breath,Weakness  Symptom Change Since Last Assessment: First assessment  Self-Isolation Status (See Link in Sidebar): Self-isolation not necessary     Patient states \"I am not too bad. I have the oxygen and I don't always use it. I will try to make grilled cheese or walk short distance to go to the bathroom without using the oxygen. I have been measuring it often and it's been as low as 88% and then when I sit and take deep breaths, it will go up to 92%.\"    Additional topics reviewed with patient:   · Since last virtual visit, patient is feeling OK  · Patient is taking all medications as prescribed  · Patient did not have questions or concerns regarding the medications prescribed    Recommendation:  Reinforced patient instructions provided by v-visit/ED provider, including continued self-monitoring of symptoms and self-quarantine: per CDC guidelines.  Patient verbalized understanding to contact PCP for worsening symptoms.    Assisted patient with activation of Care Companion for self-monitoring of COVID-19 symptoms.     negative

## 2022-02-21 NOTE — PRE-ANESTHESIA EVALUATION ADULT - NSPREOPDXFT_GEN_ALL_CORE
Post-Operative  Day 3    Ganesh Head       Assessment:   Hospital Problems  Date Reviewed: 2022          Codes Class Noted POA    Twin pregnancy, twins dichorionic and diamniotic ICD-10-CM: O30.46  ICD-9-CM: 651.00, V91.03  2022 Yes        33 weeks gestation of pregnancy ICD-10-CM: Z3A.33  ICD-9-CM: V22.2  2022 Yes        * (Principal)  premature rupture of membranes (PPROM) with onset of labor after 24 hours of rupture in first trimester, antepartum ICD-10-CM: O42.111  ICD-9-CM: 658.23  2022 Yes        S/P emergency  section ICD-10-CM: H39.526  ICD-9-CM: V45.89  2022 No            Post-Op day 3, doing well    Plan:   1. Discharge home today  2. Follow up in office in 2 and 6 weeks with Jj Campo MD  3. Post partum activity/wound care advised, diet as tolerated  4. Discharge Medications: percocet and medications prior to admission      Information for the patient's :  Dwayne Flynn [[id number]]   , Low Transverse   Information for the patient's :  Dwayne Flynn [[id number]]   , Low Vertical    Patient doing well without significant complaint. Tolerating diet, passing flatus, voiding and ambulating without difficulty. No BM yet. Babies are doing well in NICU, off any oxygen.     Current Facility-Administered Medications   Medication Dose Route Frequency    escitalopram oxalate (LEXAPRO) tablet 10 mg  10 mg Oral DAILY    sodium chloride (NS) flush 5-40 mL  5-40 mL IntraVENous Q8H    sodium chloride (NS) flush 5-40 mL  5-40 mL IntraVENous Q8H    sodium chloride (NS) flush 5-40 mL  5-40 mL IntraVENous Q8H       Vitals:  Visit Vitals  /75 (BP 1 Location: Right upper arm, BP Patient Position: At rest;Lying)   Pulse (!) 55   Temp 98 °F (36.7 °C)   Resp 16   Ht 5' 2\" (1.575 m)   Wt 91.6 kg (202 lb)   SpO2 99%   Breastfeeding Unknown   BMI 36.95 kg/m²     Temp (24hrs), Av.1 °F (36.7 °C),
Min:97.9 °F (36.6 °C), Max:98.3 °F (36.8 °C)        Exam:        Patient without distress. Abdomen, bowel sounds present, soft, expected tenderness, fundus firm                Wound incision clean, dry and intact               Lower extremities are negative for swelling, cords or tenderness. Labs:   Lab Results   Component Value Date/Time    WBC 9.0 02/18/2022 02:13 AM    WBC 5.5 02/10/2021 01:40 PM    WBC 11.1 11/24/2020 09:50 AM    HGB 8.6 (L) 02/19/2022 12:00 PM    HGB 11.1 (L) 02/18/2022 02:13 AM    HGB 14.4 02/10/2021 01:40 PM    HCT 26.2 (L) 02/19/2022 12:00 PM    HCT 33.6 (L) 02/18/2022 02:13 AM    HCT 45.1 (H) 02/10/2021 01:40 PM    PLATELET 880 75/14/2899 02:13 AM    PLATELET 914 87/16/5171 01:40 PM    PLATELET 458 46/83/2406 09:50 AM       No results found for this or any previous visit (from the past 24 hour(s)).
IUP
IUP

## 2022-03-17 ENCOUNTER — NON-APPOINTMENT (OUTPATIENT)
Age: 32
End: 2022-03-17

## 2022-04-26 ENCOUNTER — APPOINTMENT (OUTPATIENT)
Dept: OBGYN | Facility: CLINIC | Age: 32
End: 2022-04-26
Payer: COMMERCIAL

## 2022-04-26 VITALS
HEIGHT: 62 IN | BODY MASS INDEX: 25.76 KG/M2 | SYSTOLIC BLOOD PRESSURE: 124 MMHG | WEIGHT: 140 LBS | DIASTOLIC BLOOD PRESSURE: 79 MMHG

## 2022-04-26 PROCEDURE — 99395 PREV VISIT EST AGE 18-39: CPT

## 2022-04-26 RX ORDER — NORETHINDRONE ACETATE AND ETHINYL ESTRADIOL AND FERROUS FUMARATE 1MG-20(21)
1-20 KIT ORAL
Qty: 3 | Refills: 1 | Status: DISCONTINUED | COMMUNITY
Start: 2021-12-13 | End: 2022-04-26

## 2022-04-26 NOTE — PLAN
[FreeTextEntry1] : NELI LOWERY 32 year old F pt presents for annual exam. \par \par \par 1. HCM\par -Pap/HPV test collected and sent at today's visit\par -Discussed and reviewed importance of monthly BSE\par -inc estrogen on OCP due to intermenstural bleeding\par -RTO annually unless acute issue\par Magalys Keith MD \par \par \par

## 2022-04-26 NOTE — HISTORY OF PRESENT ILLNESS
[Patient reported PAP Smear was normal] : Patient reported PAP Smear was normal [Currently Active] : currently active [Men] : men [No] : No [FreeTextEntry1] : NELI LOWERY 32 year old  F pt presents for routine gyn visit. Reports periods irregular, bright red in color. These sx started after having sexual intercourse last Saturday. States cycles are normal. Pt on Junel.\par Denies any breast issues.\par \par PAP Smear: 2020\par PMHx: Raynaud, asthma\par PSHx: oral surgery\par  [PapSmeardate] : 02/2020 [TextBox_31] : PAP negative [LMPDate] : 04/09/22

## 2022-04-26 NOTE — END OF VISIT
[FreeTextEntry3] : I, Jim Kendricktorito acted as a scribe on behalf of Dr. Keith on 04/26/2022 \par \par All medical entries made by the scribe were at my, Dr. Keith, direction and personally dictated by me on 04/26/2022. I have reviewed the chart and agree that the record accurately reflects my personal performance of the history, physical exam, assessment and plan. I have also personally directed, reviewed, and agreed with the chart.\par

## 2022-04-28 LAB — HPV HIGH+LOW RISK DNA PNL CVX: NOT DETECTED

## 2022-04-30 LAB — CYTOLOGY CVX/VAG DOC THIN PREP: NORMAL

## 2022-07-14 ENCOUNTER — APPOINTMENT (OUTPATIENT)
Dept: OTOLARYNGOLOGY | Facility: CLINIC | Age: 32
End: 2022-07-14

## 2022-07-14 VITALS
BODY MASS INDEX: 25.76 KG/M2 | WEIGHT: 140 LBS | DIASTOLIC BLOOD PRESSURE: 69 MMHG | HEART RATE: 79 BPM | HEIGHT: 62 IN | SYSTOLIC BLOOD PRESSURE: 110 MMHG

## 2022-07-14 PROCEDURE — 99203 OFFICE O/P NEW LOW 30 MIN: CPT

## 2022-07-14 NOTE — REVIEW OF SYSTEMS
[Sneezing] : sneezing [Seasonal Allergies] : seasonal allergies [Post Nasal Drip] : post nasal drip [Hearing Loss] : hearing loss [Ear Noises] : ear noises [Nasal Congestion] : nasal congestion [Recurrent Sinus Infections] : recurrent sinus infections [Sinus Pressure] : sinus pressure [Discolored Nasal Discharge] : discolored nasal discharge [Throat Pain] : throat pain [Throat Dryness] : throat dryness [Eyes Itch] : itching of the eyes [Wheezing] : wheezing [Cough] : cough [Negative] : Heme/Lymph [Patient Intake Form Reviewed] : Patient intake form was reviewed [FreeTextEntry6] : Noisy breathing

## 2022-07-14 NOTE — PHYSICAL EXAM
[de-identified] : MILD CONGESTION/ PATENT AIRWAYS [Normal] : mucosa is normal [Midline] : trachea located in midline position

## 2022-07-14 NOTE — ASSESSMENT
[FreeTextEntry1] : EUCALYPTUS HUMIDIFER\par SINGULAIR\par CT SINUS\par F/U AFTER ABOVE\par SYMPTOMS AGGRAVATED BY ALLERGY

## 2022-07-14 NOTE — HISTORY OF PRESENT ILLNESS
[de-identified] : RECURRENT RHINITS / SINUSITIS WITH INCREASED IN FREQUENCY SINCE 2016\par ALLERY TO MANY THING AND TAKING SINGULAIR\par MEDICAL HX REVIEWED

## 2022-07-15 ENCOUNTER — OUTPATIENT (OUTPATIENT)
Dept: OUTPATIENT SERVICES | Facility: HOSPITAL | Age: 32
LOS: 1 days | End: 2022-07-15
Payer: COMMERCIAL

## 2022-07-15 ENCOUNTER — APPOINTMENT (OUTPATIENT)
Dept: CT IMAGING | Facility: CLINIC | Age: 32
End: 2022-07-15

## 2022-07-15 DIAGNOSIS — Z00.8 ENCOUNTER FOR OTHER GENERAL EXAMINATION: ICD-10-CM

## 2022-07-15 DIAGNOSIS — J32.9 CHRONIC SINUSITIS, UNSPECIFIED: ICD-10-CM

## 2022-07-15 PROCEDURE — 70486 CT MAXILLOFACIAL W/O DYE: CPT

## 2022-07-15 PROCEDURE — 70486 CT MAXILLOFACIAL W/O DYE: CPT | Mod: 26

## 2022-10-06 ENCOUNTER — NON-APPOINTMENT (OUTPATIENT)
Age: 32
End: 2022-10-06

## 2022-12-14 ENCOUNTER — NON-APPOINTMENT (OUTPATIENT)
Age: 32
End: 2022-12-14

## 2023-01-24 ENCOUNTER — NON-APPOINTMENT (OUTPATIENT)
Age: 33
End: 2023-01-24

## 2023-01-31 ENCOUNTER — NON-APPOINTMENT (OUTPATIENT)
Age: 33
End: 2023-01-31

## 2023-01-31 ENCOUNTER — APPOINTMENT (OUTPATIENT)
Dept: INTERNAL MEDICINE | Facility: CLINIC | Age: 33
End: 2023-01-31
Payer: COMMERCIAL

## 2023-01-31 VITALS
HEART RATE: 86 BPM | TEMPERATURE: 98.3 F | DIASTOLIC BLOOD PRESSURE: 74 MMHG | OXYGEN SATURATION: 99 % | SYSTOLIC BLOOD PRESSURE: 118 MMHG | BODY MASS INDEX: 25.95 KG/M2 | WEIGHT: 141 LBS | HEIGHT: 62 IN | RESPIRATION RATE: 14 BRPM

## 2023-01-31 DIAGNOSIS — Z87.42 PERSONAL HISTORY OF OTHER DISEASES OF THE FEMALE GENITAL TRACT: ICD-10-CM

## 2023-01-31 DIAGNOSIS — R00.2 PALPITATIONS: ICD-10-CM

## 2023-01-31 DIAGNOSIS — J31.0 CHRONIC RHINITIS: ICD-10-CM

## 2023-01-31 DIAGNOSIS — Z23 ENCOUNTER FOR IMMUNIZATION: ICD-10-CM

## 2023-01-31 DIAGNOSIS — Z87.898 PERSONAL HISTORY OF OTHER SPECIFIED CONDITIONS: ICD-10-CM

## 2023-01-31 DIAGNOSIS — Z87.09 PERSONAL HISTORY OF OTHER DISEASES OF THE RESPIRATORY SYSTEM: ICD-10-CM

## 2023-01-31 PROCEDURE — 99202 OFFICE O/P NEW SF 15 MIN: CPT | Mod: 25

## 2023-01-31 PROCEDURE — 99385 PREV VISIT NEW AGE 18-39: CPT | Mod: 25

## 2023-01-31 PROCEDURE — G0444 DEPRESSION SCREEN ANNUAL: CPT | Mod: 59

## 2023-01-31 PROCEDURE — 93000 ELECTROCARDIOGRAM COMPLETE: CPT | Mod: 59

## 2023-01-31 RX ORDER — NORETHINDRONE ACETATE AND ETHINYL ESTRADIOL AND FERROUS FUMARATE 1MG-20(24)
1-20 KIT ORAL DAILY
Qty: 1 | Refills: 0 | Status: DISCONTINUED | COMMUNITY
Start: 2021-10-29 | End: 2023-01-31

## 2023-01-31 RX ORDER — NORETHINDRONE ACETATE AND ETHINYL ESTRADIOL 1; 20 MG/1; UG/1
1-20 TABLET ORAL DAILY
Qty: 4 | Refills: 0 | Status: DISCONTINUED | COMMUNITY
Start: 2021-10-18 | End: 2023-01-31

## 2023-01-31 RX ORDER — MONTELUKAST 10 MG/1
10 TABLET, FILM COATED ORAL
Refills: 0 | Status: DISCONTINUED | COMMUNITY
Start: 2023-01-31 | End: 2023-01-31

## 2023-01-31 NOTE — PHYSICAL EXAM
[No Edema] : there was no peripheral edema [Coordination Grossly Intact] : coordination grossly intact [No Focal Deficits] : no focal deficits [Normal Gait] : normal gait [Normal] : affect was normal and insight and judgment were intact [de-identified] : circular abrasion noted on right hand, no fluctuance, warmth, or redness

## 2023-01-31 NOTE — HEALTH RISK ASSESSMENT
[Good] : ~his/her~  mood as  good [No] : No [0] : 2) Feeling down, depressed, or hopeless: Not at all (0) [PHQ-2 Negative - No further assessment needed] : PHQ-2 Negative - No further assessment needed [Patient reported PAP Smear was normal] : Patient reported PAP Smear was normal [Patient reported colonoscopy was normal] : Patient reported colonoscopy was normal [With Family] : lives with family [Employed] : employed [] :  [# Of Children ___] : has [unfilled] children [Fully functional (bathing, dressing, toileting, transferring, walking, feeding)] : Fully functional (bathing, dressing, toileting, transferring, walking, feeding) [Fully functional (using the telephone, shopping, preparing meals, housekeeping, doing laundry, using] : Fully functional and needs no help or supervision to perform IADLs (using the telephone, shopping, preparing meals, housekeeping, doing laundry, using transportation, managing medications and managing finances) [[id number]] : 0 [PapSmearDate] : 04/22 [ColonoscopyDate] : 01/16 [ColonoscopyComments] : she has a family history of colon cancer in father, was not genetic, next colonoscopy at age 51 [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

## 2023-01-31 NOTE — HISTORY OF PRESENT ILLNESS
[FreeTextEntry1] : establish care, CPE  [de-identified] : Patient is a 32 year old female with history of mild intermittent asthma, allergic rhinitis, Raynauds phenomenon who presents today to establish care. She feels well today. She was bitten by a dog (dog was vaccinated) in the hand earlier today but bite did not break skin. She is up to date with her tetanus booster. The area is not swollen, red, or painful. \par \par Patient states that she noticed a few days ago that her heart rate went up to 125 bpm. She has been feeling her heart racing (she had been standing still at the time) and had some associated lightheadedness, but it eventually resolved. She states when she was pregnant two years ago, she had palpitations and went to a cardiologist; she had a Holter monitor placed and study was negative. It was thought that the palpitations were reactive to pregnancy. She does report not hydrating and not eating enough throughout the day as she is bsy at her job.

## 2023-01-31 NOTE — PLAN
[FreeTextEntry1] : HCM: up to date with pap smear. Has family history of colon cancer, patient had her first colonoscopy at age 25 but her father got tested for genetic syndrome and was negative. She was told her next colonoscopy would be at routine screening age. Recieved flu vaccine, up to date with Tdap booster. Check routine labs today, add on LANCE and RF given history of Raynauds. \par \par Palpitations: EKG in office today is sinus rhythm with rate variation, RSR nondiagnostic marker, overall looks similar to prior EKG. Pt advised to hydrate throughout the day, eat regular meals. Check TSH, CBC to rule out intrinsic cause. Pt advised to follow with cardio. \par \par Dog bite hand: Does not appear infected however given location of bite with empirically treat with Augmentin. Patient advised to keep area clean, dry. Reports that dog was vaccinated, states she is up to date with her Tdap booster. \par \par Asthma: mild intermittent with associated allergic component. Continue Singulair.

## 2023-02-01 LAB
25(OH)D3 SERPL-MCNC: 31.1 NG/ML
ALBUMIN SERPL ELPH-MCNC: 4.5 G/DL
ALP BLD-CCNC: 41 U/L
ALT SERPL-CCNC: 12 U/L
ANION GAP SERPL CALC-SCNC: 14 MMOL/L
AST SERPL-CCNC: 16 U/L
BASOPHILS # BLD AUTO: 0.05 K/UL
BASOPHILS NFR BLD AUTO: 0.7 %
BILIRUB SERPL-MCNC: 0.3 MG/DL
BUN SERPL-MCNC: 12 MG/DL
CALCIUM SERPL-MCNC: 10 MG/DL
CHLORIDE SERPL-SCNC: 101 MMOL/L
CHOLEST SERPL-MCNC: 201 MG/DL
CO2 SERPL-SCNC: 23 MMOL/L
CREAT SERPL-MCNC: 0.72 MG/DL
EGFR: 115 ML/MIN/1.73M2
EOSINOPHIL # BLD AUTO: 0.58 K/UL
EOSINOPHIL NFR BLD AUTO: 7.7 %
ESTIMATED AVERAGE GLUCOSE: 100 MG/DL
FOLATE SERPL-MCNC: 6.5 NG/ML
GLUCOSE SERPL-MCNC: 71 MG/DL
HBA1C MFR BLD HPLC: 5.1 %
HCT VFR BLD CALC: 40.8 %
HDLC SERPL-MCNC: 55 MG/DL
HGB BLD-MCNC: 13.2 G/DL
IMM GRANULOCYTES NFR BLD AUTO: 0.3 %
LDLC SERPL CALC-MCNC: 133 MG/DL
LYMPHOCYTES # BLD AUTO: 2.46 K/UL
LYMPHOCYTES NFR BLD AUTO: 32.5 %
MAN DIFF?: NORMAL
MCHC RBC-ENTMCNC: 30.6 PG
MCHC RBC-ENTMCNC: 32.4 GM/DL
MCV RBC AUTO: 94.4 FL
MONOCYTES # BLD AUTO: 0.45 K/UL
MONOCYTES NFR BLD AUTO: 6 %
NEUTROPHILS # BLD AUTO: 4 K/UL
NEUTROPHILS NFR BLD AUTO: 52.8 %
NONHDLC SERPL-MCNC: 146 MG/DL
PLATELET # BLD AUTO: 321 K/UL
POTASSIUM SERPL-SCNC: 4.3 MMOL/L
PROT SERPL-MCNC: 7.3 G/DL
RBC # BLD: 4.32 M/UL
RBC # FLD: 12.9 %
RHEUMATOID FACT SER QL: <10 IU/ML
SODIUM SERPL-SCNC: 138 MMOL/L
TRIGL SERPL-MCNC: 65 MG/DL
TSH SERPL-ACNC: 1.72 UIU/ML
VIT B12 SERPL-MCNC: 552 PG/ML
WBC # FLD AUTO: 7.56 K/UL

## 2023-02-02 LAB
ANA PAT FLD IF-IMP: ABNORMAL
ANA SER IF-ACNC: ABNORMAL

## 2023-03-15 ENCOUNTER — LABORATORY RESULT (OUTPATIENT)
Age: 33
End: 2023-03-15

## 2023-03-15 ENCOUNTER — APPOINTMENT (OUTPATIENT)
Dept: RHEUMATOLOGY | Facility: CLINIC | Age: 33
End: 2023-03-15
Payer: COMMERCIAL

## 2023-03-15 VITALS
DIASTOLIC BLOOD PRESSURE: 80 MMHG | HEART RATE: 80 BPM | BODY MASS INDEX: 26.13 KG/M2 | TEMPERATURE: 98.1 F | OXYGEN SATURATION: 98 % | WEIGHT: 142 LBS | SYSTOLIC BLOOD PRESSURE: 120 MMHG | HEIGHT: 62 IN

## 2023-03-15 DIAGNOSIS — H91.90 UNSPECIFIED HEARING LOSS, UNSPECIFIED EAR: ICD-10-CM

## 2023-03-15 DIAGNOSIS — Z87.09 PERSONAL HISTORY OF OTHER DISEASES OF THE RESPIRATORY SYSTEM: ICD-10-CM

## 2023-03-15 DIAGNOSIS — Z83.438 FAMILY HISTORY OF OTHER DISORDER OF LIPOPROTEIN METABOLISM AND OTHER LIPIDEMIA: ICD-10-CM

## 2023-03-15 DIAGNOSIS — Z82.49 FAMILY HISTORY OF ISCHEMIC HEART DISEASE AND OTHER DISEASES OF THE CIRCULATORY SYSTEM: ICD-10-CM

## 2023-03-15 DIAGNOSIS — Z80.8 FAMILY HISTORY OF MALIGNANT NEOPLASM OF OTHER ORGANS OR SYSTEMS: ICD-10-CM

## 2023-03-15 DIAGNOSIS — Z83.3 FAMILY HISTORY OF DIABETES MELLITUS: ICD-10-CM

## 2023-03-15 PROCEDURE — 99205 OFFICE O/P NEW HI 60 MIN: CPT | Mod: 25

## 2023-03-15 PROCEDURE — 36415 COLL VENOUS BLD VENIPUNCTURE: CPT

## 2023-03-17 LAB
ALBUMIN SERPL ELPH-MCNC: 4.7 G/DL
ALP BLD-CCNC: 55 U/L
ALT SERPL-CCNC: 15 U/L
ANA PAT FLD IF-IMP: ABNORMAL
ANA SER IF-ACNC: ABNORMAL
ANION GAP SERPL CALC-SCNC: 13 MMOL/L
AST SERPL-CCNC: 23 U/L
BASOPHILS # BLD AUTO: 0.06 K/UL
BASOPHILS NFR BLD AUTO: 0.9 %
BILIRUB SERPL-MCNC: 0.4 MG/DL
BUN SERPL-MCNC: 14 MG/DL
C3 SERPL-MCNC: 93 MG/DL
C4 SERPL-MCNC: 39 MG/DL
CALCIUM SERPL-MCNC: 10 MG/DL
CELIACPAN: NORMAL
CENTROMERE IGG SER-ACNC: <0.2 CD:130001892
CH50 SERPL-MCNC: 57 U/ML
CHLORIDE SERPL-SCNC: 100 MMOL/L
CHROMATIN AB SERPL-ACNC: 1.2 AL
CO2 SERPL-SCNC: 24 MMOL/L
CREAT SERPL-MCNC: 0.69 MG/DL
CRP SERPL-MCNC: 5 MG/L
DSDNA AB SER-ACNC: <12 IU/ML
EGFR: 118 ML/MIN/1.73M2
ENA RNP AB SER IA-ACNC: 0.2 AL
ENA SCL70 IGG SER IA-ACNC: <0.2 AL
ENA SM AB SER IA-ACNC: <0.2 AL
ENA SS-A AB SER IA-ACNC: 0.3 AL
ENA SS-B AB SER IA-ACNC: <0.2 AL
EOSINOPHIL # BLD AUTO: 0.4 K/UL
EOSINOPHIL NFR BLD AUTO: 5.9 %
ERYTHROCYTE [SEDIMENTATION RATE] IN BLOOD BY WESTERGREN METHOD: 54 MM/HR
HCT VFR BLD CALC: 41.2 %
HGB BLD-MCNC: 12.8 G/DL
HISTONE AB SER QL: 0.4 UNITS
IMM GRANULOCYTES NFR BLD AUTO: 0.3 %
LYMPHOCYTES # BLD AUTO: 2.17 K/UL
LYMPHOCYTES NFR BLD AUTO: 31.9 %
MAN DIFF?: NORMAL
MCHC RBC-ENTMCNC: 29.1 PG
MCHC RBC-ENTMCNC: 31.1 GM/DL
MCV RBC AUTO: 93.6 FL
MONOCYTES # BLD AUTO: 0.47 K/UL
MONOCYTES NFR BLD AUTO: 6.9 %
NEUTROPHILS # BLD AUTO: 3.68 K/UL
NEUTROPHILS NFR BLD AUTO: 54.1 %
PLATELET # BLD AUTO: 343 K/UL
POTASSIUM SERPL-SCNC: 4.1 MMOL/L
PROT SERPL-MCNC: 7.7 G/DL
RBC # BLD: 4.4 M/UL
RBC # FLD: 12.6 %
RHEUMATOID FACT SER QL: 12 IU/ML
SODIUM SERPL-SCNC: 137 MMOL/L
THYROGLOB AB SERPL-ACNC: <20 IU/ML
THYROPEROXIDASE AB SERPL IA-ACNC: 27.1 IU/ML
WBC # FLD AUTO: 6.8 K/UL

## 2023-03-20 NOTE — HISTORY OF PRESENT ILLNESS
[FreeTextEntry1] : Ms. 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   Used to be quite severe, that numbness would develop in her fingers even with taking a shower.  Her rheum workup was negative at the time through pediatric rheumatology- though she doesn’t remember which serologies were tested and which were positive.   However her mom developed SLE and her PCP subsequently did an LANCE which returned positive.  she is here for further evaluation \par - when originally dx with RP, she was given a medication that made her feel warm - she doesn’t remember what that pill was, though remembers it was an orange pill with green liquid.  at the time she was unable to swallow pills and so she opened the capsule and swallowed the liquid.  it made her feel warm and she did great on it.  she doesn’t remember what kind of pill it was\par - she thought that her RF has been positive- which is now negative\par - had eye w/u for inflammatory eye disease as a child and that was negative\par - the color changes in the hands are better and stable now.  she still gets attacks.  she currently uses environmental strategies to keep her hands warm and without flare   she has heated steering wheels, uggs and she does not smoke cigarettes\par - has allergies and does take sudafed - otherwise no medication and not on CCB\par - denies finger ulceration or chronic pitting \par - never had gangrene \par - todoay is a good day with the fingers - and no chronic pain in the fingers \par - no skin issues - tightening, refulx - breathes well\par \par Rheum ROS \par - denies RP, sicca, oral ulcers, rashes, photosensitivity.   \par - denies skin tightening, ulcerations, nodules\par - denies constitutional symptoms, fatigue, night sweats.  weight is stable \par - Denies psoriasis, IBD, Inflammatory eye disease, STD, infectious diarrhea\par - breathes well without h/o of pleuritis, pericarditis.  renal function is normal and urine is not frothy\par - muscles are strong and there is no neurologic issues\par - no headaches, jaw pain with chewing, visual loss, scalp tenderness or double vision\par \par \par pMHx: astma \par Meds: montekleukist - takes sudafed albuteral \par \par \par

## 2023-03-20 NOTE — ASSESSMENT
[FreeTextEntry1] : Ms. 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  \par -- discussed potential triggers (including cold, stress, smoke) as well as nonpharmacologic measures to help \par -- avoidance of cold exposure as well as sudden temperature changes (circumstances that can trigger an attack include air conditioned environments, holding an ice-cold drink, working with cold foods such as making meatballs or washing salad, seasonal changes such as cool rainy days. \par -- maintain whole body as well as digital warmth such as hand warmers, socks, layered clothing, hats, thermal underwear.  Can heat up clothing in a dryer and put them on while they are still warm before entering a cold environment.\par -- avoidance of vasoconstricting drugs when possible such as OTC nasal decongestants, diet pills, amphetamines, some headache meds - discussed sudafed use may worsen her RP - currently she says that it doesn’t seem to be doing that but will be vigilant\par -- t/c pharmacologic therapy should these measures fail or sxs worsen.\par \par More than 50% of the encounter was spent counseling the patient on differential, workup, disease course and treatment/management.  Education was provided to the patient during this encounter.  All questions and concerns were addressed and answered.   The patient verbalized understanding and agreed to the plan. \par \par Patient has been instructed to call for an appointment if new symptoms develop.\par Patient has been instructed to make a followup appointment in 3 weeks\par \par Time spent on the encounter included, but is not limited to, preparing to see the patient, obtaining and/or reviewing separately obtained history, performing the evaluation, counseling and educating, independently interpreting results with communication to patient, order placement, referring and/or communicating with other health professionals as described, and documenting clinical information in the electronic health record\par \par

## 2023-03-21 LAB
ALBUMIN MFR SERPL ELPH: 55.3 %
ALBUMIN SERPL-MCNC: 4.3 G/DL
ALBUMIN/GLOB SERPL: 1.3 RATIO
ALPHA1 GLOB MFR SERPL ELPH: 3.9 %
ALPHA1 GLOB SERPL ELPH-MCNC: 0.3 G/DL
ALPHA2 GLOB MFR SERPL ELPH: 10.4 %
ALPHA2 GLOB SERPL ELPH-MCNC: 0.8 G/DL
ANTI-BETA2 GLYCOPROTEIN 1 IGG CONCENTRATION: 3 U/ML
ANTI-BETA2 GLYCOPROTEIN 1 IGM CONCENTRATION: 6 U/ML
ANTI-CARDIOLIPIN IGG CONCENTRATION: 2 GPL
ANTI-CARDIOLIPIN IGM CONCENTRATION: 8 MPL
ANTI-CENP IGG CONCENTRATION: 1 U/ML
ANTI-CYCLIC CITRULLINATED PEPTIDE IGG CONCENTRATION: 2 U/ML
ANTI-DOUBLE-STRANDED DNA IGG CONCENTRATION: 52 IU/ML
ANTI-JO-1 IGG CONCENTRATION: <0.3 U/ML
ANTI-NUCLEAR ANTIBODIES - CYTOPLASMIC PATTERN: NORMAL
ANTI-NUCLEAR ANTIBODIES - PRIMARY NUCLEAR PATTERN: NORMAL
ANTI-NUCLEAR ANTIBODIES - PRIMARY PATTERN TITER: ABNORMAL
ANTI-NUCLEAR ANTIBODIES IGG CONCENTRATION: 86 UNITS
ANTI-RNA POL III IGG CONCENTRATION: <0.7 U/ML
ANTI-RNP70 IGG CONCENTRATION: 3 U/ML
ANTI-RO52 IGG CONCENTRATION: 0 U/ML
ANTI-RO60 IGG CONCENTRATION: <0.4 U/ML
ANTI-SCL-70 IGG CONCENTRATION: 1 U/ML
ANTI-SMITH IGG CONCENTRATION: <0.7 U/ML
ANTI-SS-B (LA) IGG CONCENTRATION: 1 U/ML
ANTI-THYROGLOBULIN IGG CONCENTRATION: 15 IU/ML
ANTI-THYROID PEROXIDASE IGG CONCENTRATION: <4 IU/ML
ANTI-U1RNP IGG CONCENTRATION: 2 U/ML
AVISE LUPUS INDEX: 0
AVISE LUPUS RESULT: NORMAL
B-GLOBULIN MFR SERPL ELPH: 12.8 %
B-GLOBULIN SERPL ELPH-MCNC: 1 G/DL
B-LYMPHOCYTE-BOUND C4D (BC4D) LEVEL: 16
DEPRECATED KAPPA LC FREE/LAMBDA SER: 1.74 RATIO
ERYTHROCYTE-BOUND C4D (EC4D) LEVEL: 6
GAMMA GLOB FLD ELPH-MCNC: 1.4 G/DL
GAMMA GLOB MFR SERPL ELPH: 17.6 %
IGA SER QL IEP: 162 MG/DL
IGG SER QL IEP: 1433 MG/DL
IGM SER QL IEP: 219 MG/DL
INTERPRETATION SERPL IEP-IMP: NORMAL
KAPPA LC CSF-MCNC: 1.37 MG/DL
KAPPA LC SERPL-MCNC: 2.39 MG/DL
M PROTEIN SPEC IFE-MCNC: NORMAL
PROT SERPL-MCNC: 7.7 G/DL
PROT SERPL-MCNC: 7.7 G/DL
RHEUMATOID FACTOR (IGA) CONCENTRATION: 34 IU/ML
RHEUMATOID FACTOR (IGM) CONCENTRATION: 10 IU/ML
RNA POLYMERASE III IGG: 5 UNITS

## 2023-03-27 LAB
CA VI IGA AB: 5.1 EU/ML
CA VI IGG AB: 4.7 EU/ML
CA VI IGM AB: 12.7 EU/ML
PSP IGA AB: NORMAL
PSP IGG AB: 3.1 EU/ML
PSP IGM AB: 4.6 EU/ML
SEROLOGY COMMENTS: NORMAL
SP-1 IGA AB: 3.6 EU/ML
SP-1 IGG AB: 3.2 EU/ML
SP-1 IGM AB: 5.9 EU/ML

## 2023-04-04 LAB
EJ AB SER QL: NEGATIVE
ENA JO1 AB SER IA-ACNC: <20 UNITS
ENA PM/SCL AB SER-ACNC: <20 UNITS
ENA SM+RNP AB SER IA-ACNC: <20 UNITS
ENA SS-A IGG SER QL: <20 UNITS
FIBRILLARIN AB SER QL: NEGATIVE
KU AB SER QL: NEGATIVE
MDA-5 (P140)(CADM-140): <20 UNITS
MI2 AB SER QL: NEGATIVE
NXP-2 (P140): <20 UNITS
OJ AB SER QL: NEGATIVE
PL12 AB SER QL: NEGATIVE
PL7 AB SER QL: NEGATIVE
SRP AB SERPL QL: NEGATIVE
TIF GAMMA (P155/140): <20 UNITS
U2 SNRNP AB SER QL: NEGATIVE

## 2023-04-20 ENCOUNTER — APPOINTMENT (OUTPATIENT)
Dept: RHEUMATOLOGY | Facility: CLINIC | Age: 33
End: 2023-04-20

## 2023-04-21 ENCOUNTER — APPOINTMENT (OUTPATIENT)
Dept: RHEUMATOLOGY | Facility: CLINIC | Age: 33
End: 2023-04-21
Payer: COMMERCIAL
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
## 2023-04-21 DIAGNOSIS — Z71.9 COUNSELING, UNSPECIFIED: ICD-10-CM

## 2023-04-21 DIAGNOSIS — S61.459A OPEN BITE OF UNSPECIFIED HAND, INITIAL ENCOUNTER: ICD-10-CM

## 2023-04-21 PROCEDURE — 99214 OFFICE O/P EST MOD 30 MIN: CPT | Mod: 95

## 2023-04-22 ENCOUNTER — TRANSCRIPTION ENCOUNTER (OUTPATIENT)
Age: 33
End: 2023-04-22

## 2023-04-22 LAB — ERYTHROCYTE [SEDIMENTATION RATE] IN BLOOD BY WESTERGREN METHOD: 13 MM/HR

## 2023-04-23 PROBLEM — Z71.9 ENCOUNTER FOR EDUCATION: Status: ACTIVE | Noted: 2023-04-23

## 2023-04-23 PROBLEM — S61.459A DOG BITE, HAND: Status: RESOLVED | Noted: 2023-01-31 | Resolved: 2023-04-23

## 2023-04-23 NOTE — HISTORY OF PRESENT ILLNESS
[FreeTextEntry1] : Ms. 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   Used to be quite severe, that numbness would develop in her fingers even with taking a shower.  Her rheum workup was negative at the time through pediatric rheumatology- though she doesn’t remember which serologies were tested and which were positive.   However her mom developed SLE and her PCP subsequently did an LANCE which returned positive.  she is here for further evaluation \par - when originally dx with RP, she was given a medication that made her feel warm - she doesn’t remember what that pill was, though remembers it was an orange pill with green liquid.  at the time she was unable to swallow pills and so she opened the capsule and swallowed the liquid.  it made her feel warm and she did great on it.  she doesn’t remember what kind of pill it was\par - she thought that her RF has been positive- which is now negative\par - had eye w/u for inflammatory eye disease as a child and that was negative\par - the color changes in the hands are better and stable now.  she still gets attacks.  she currently uses environmental strategies to keep her hands warm and without flare   she has heated steering wheels, uggs and she does not smoke cigarettes\par - has allergies and does take sudafed - otherwise no medication and not on CCB\par - denies finger ulceration or chronic pitting \par - never had gangrene \par - todoay is a good day with the fingers - and no chronic pain in the fingers \par - no skin issues - tightening, refulx - breathes well\par \par INTERVAL Hx\par here to discuss b/w \par no pain  or inflammatory symptoms \par one episde of self limited, but unproviked, pain - resolved on its own - never reuccreed \par - desire spreogancy\par \par Rheum ROS \par - denies RP, sicca, oral ulcers, rashes, photosensitivity.   \par - denies skin tightening, ulcerations, nodules\par - denies constitutional symptoms, fatigue, night sweats.  weight is stable \par - Denies psoriasis, IBD, Inflammatory eye disease, STD, infectious diarrhea\par - breathes well without h/o of pleuritis, pericarditis.  renal function is normal and urine is not frothy\par - muscles are strong and there is no neurologic issues\par - no headaches, jaw pain with chewing, visual loss, scalp tenderness or double vision\par \par \par pMHx: astma \par Meds: montekleukist - takes sudafed albuteral \par \par \par

## 2023-04-23 NOTE — ASSESSMENT
[FreeTextEntry1] : Ms. Garcia is a non-smoker who works for [employer] at home with a PMHx of RP (since childhood) and FHx of SLE here to r/o evolving aiCTD\par \par #desire for pregnancy \par -- d/w patient given abnormal serologies would recommend closer f/u through pregnancy \par -- d/w patient risks of lupus (patient does not classify at this time) on pregnancy, mom, baby \par -- given silica clotting time t/c bASA if pregnant.  no h/o blood clots or miscarriage\par \par #positive LANCE\par long h/o RP and strong h/o aiCTD (mom has RA and SLE) - here for evaluation of aiCTD\par -- no s/s of evolving aiCTD on exam today or by history \par -- inflammatory markers - mildly elevated ESR - repeat to confirm\par -- serologies and sub-serologies positive for LANCE, chromatin and silica, RF.  does not meet classification criteria for aiCTD eg SLE at this time.  however, given extent of serologic positivity and RP would monitor closely for evolution\par \par #RP\par likely primary\par -- check serologies/sub- serologies to r/o secondary causes - none by history\par -- discussed goals of therapy to improve aol and prevent tissue loss (ie, ulceration, gangrene).  \par -- discussed potential triggers (including cold, stress, smoke) as well as nonpharmacologic measures to help \par -- avoidance of cold exposure as well as sudden temperature changes (circumstances that can trigger an attack include air conditioned environments, holding an ice-cold drink, working with cold foods such as making meatballs or washing salad, seasonal changes such as cool rainy days. \par -- maintain whole body as well as digital warmth such as hand warmers, socks, layered clothing, hats, thermal underwear.  Can heat up clothing in a dryer and put them on while they are still warm before entering a cold environment.\par -- avoidance of vasoconstricting drugs when possible such as OTC nasal decongestants, diet pills, amphetamines, some headache meds - discussed sudafed use may worsen her RP - currently she says that it doesn’t seem to be doing that but will be vigilant\par -- t/c pharmacologic therapy should these measures fail or sxs worsen.\par \par More than 50% of the encounter was spent counseling the patient on differential, workup, disease course and treatment/management.  Education was provided to the patient during this encounter.  All questions and concerns were addressed and answered.   The patient verbalized understanding and agreed to the plan. \par \par Patient has been instructed to call for an appointment if new symptoms develop.\par Patient has been instructed to make a followup appointment in 3 weeks\par \par Time spent on the encounter included, but is not limited to, preparing to see the patient, obtaining and/or reviewing separately obtained history, performing the evaluation, counseling and educating, independently interpreting results with communication to patient, order placement, referring and/or communicating with other health professionals as described, and documenting clinical information in the electronic health record\par \par

## 2023-04-25 ENCOUNTER — APPOINTMENT (OUTPATIENT)
Dept: INTERNAL MEDICINE | Facility: CLINIC | Age: 33
End: 2023-04-25

## 2023-04-25 ENCOUNTER — TRANSCRIPTION ENCOUNTER (OUTPATIENT)
Age: 33
End: 2023-04-25

## 2023-05-02 ENCOUNTER — APPOINTMENT (OUTPATIENT)
Dept: OBGYN | Facility: CLINIC | Age: 33
End: 2023-05-02
Payer: COMMERCIAL

## 2023-05-02 VITALS — DIASTOLIC BLOOD PRESSURE: 70 MMHG | SYSTOLIC BLOOD PRESSURE: 105 MMHG | BODY MASS INDEX: 25.97 KG/M2 | WEIGHT: 142 LBS

## 2023-05-02 DIAGNOSIS — Z01.419 ENCOUNTER FOR GYNECOLOGICAL EXAMINATION (GENERAL) (ROUTINE) W/OUT ABNORMAL FINDINGS: ICD-10-CM

## 2023-05-02 PROCEDURE — 99395 PREV VISIT EST AGE 18-39: CPT

## 2023-05-02 NOTE — HISTORY OF PRESENT ILLNESS
[FreeTextEntry1] : 33 year old patient,  LMP 04/07/2023,  presents today for annual gyn visit and offers no complaints. Pt is planning to conceive soon, doing well on OCP\par seeing her rheumatologist  [PapSmeardate] : 04/2022

## 2023-05-02 NOTE — PLAN
[FreeTextEntry1] :  33 year old patient presents for annual, denies complaints\par - Routine breast and pelvic exam done, wnl \par - Discussed monthly BSE \par - Pap smear done \par - Pt planning to conceive, advised to take PNV and folic acid \par - RTO 1 year for annual  or when pregnant\par Magalys Keith MD

## 2023-05-04 LAB — HPV HIGH+LOW RISK DNA PNL CVX: NOT DETECTED

## 2023-05-08 LAB — CYTOLOGY CVX/VAG DOC THIN PREP: NORMAL

## 2023-06-15 NOTE — COUNSELING
[Nutrition/ Exercise/ Weight Management] : nutrition, exercise, weight management [Vitamins/Supplements] : vitamins/supplements [Breast Self Exam] : breast self exam [Contraception/ Emergency Contraception/ Safe Sexual Practices] : contraception, emergency contraception, safe sexual practices [Preconception Care/ Fertility options] : preconception care, fertility options [STD (testing, results, tx)] : STD (testing, results, tx) Not applicable

## 2023-09-01 NOTE — PRE-ANESTHESIA EVALUATION ADULT - NSATTENDATTESTRD_GEN_ALL_CORE
The patient has been re-examined and I agree with the above assessment or I updated with my findings.
Number Of Hemigard Strips Per Side: 1
The patient has been re-examined and I agree with the above assessment or I updated with my findings.

## 2023-10-05 ENCOUNTER — NON-APPOINTMENT (OUTPATIENT)
Age: 33
End: 2023-10-05

## 2023-10-10 ENCOUNTER — LABORATORY RESULT (OUTPATIENT)
Age: 33
End: 2023-10-10

## 2023-10-10 ENCOUNTER — APPOINTMENT (OUTPATIENT)
Dept: RHEUMATOLOGY | Facility: CLINIC | Age: 33
End: 2023-10-10
Payer: COMMERCIAL

## 2023-10-10 VITALS
RESPIRATION RATE: 16 BRPM | OXYGEN SATURATION: 98 % | HEIGHT: 62 IN | SYSTOLIC BLOOD PRESSURE: 109 MMHG | DIASTOLIC BLOOD PRESSURE: 76 MMHG | WEIGHT: 140 LBS | BODY MASS INDEX: 25.76 KG/M2 | HEART RATE: 76 BPM

## 2023-10-10 PROCEDURE — 99214 OFFICE O/P EST MOD 30 MIN: CPT

## 2023-10-12 LAB
ALBUMIN SERPL ELPH-MCNC: 4.6 G/DL
ALP BLD-CCNC: 65 U/L
ALT SERPL-CCNC: 12 U/L
ANA PAT FLD IF-IMP: ABNORMAL
ANA SER IF-ACNC: ABNORMAL
ANION GAP SERPL CALC-SCNC: 16 MMOL/L
AST SERPL-CCNC: 20 U/L
BILIRUB SERPL-MCNC: 0.2 MG/DL
BUN SERPL-MCNC: 15 MG/DL
C3 SERPL-MCNC: 95 MG/DL
C4 SERPL-MCNC: 38 MG/DL
CALCIUM SERPL-MCNC: 10.2 MG/DL
CCP AB SER IA-ACNC: <8 UNITS
CHLORIDE SERPL-SCNC: 102 MMOL/L
CHROMATIN AB SERPL-ACNC: 1.3 AL
CO2 SERPL-SCNC: 22 MMOL/L
CREAT SERPL-MCNC: 0.67 MG/DL
CREAT SPEC-SCNC: 18 MG/DL
CREAT/PROT UR: NORMAL RATIO
CRP SERPL-MCNC: <3 MG/L
EGFR: 119 ML/MIN/1.73M2
ERYTHROCYTE [SEDIMENTATION RATE] IN BLOOD BY WESTERGREN METHOD: 36 MM/HR
HCT VFR BLD CALC: 39.1 %
HGB BLD-MCNC: 12.3 G/DL
MCHC RBC-ENTMCNC: 27.8 PG
MCHC RBC-ENTMCNC: 31.5 GM/DL
MCV RBC AUTO: 88.5 FL
PLATELET # BLD AUTO: 358 K/UL
POTASSIUM SERPL-SCNC: 4.7 MMOL/L
PROT SERPL-MCNC: 7.8 G/DL
PROT UR-MCNC: <4 MG/DL
RBC # BLD: 4.42 M/UL
RBC # FLD: 14.8 %
RF+CCP IGG SER-IMP: NEGATIVE
RHEUMATOID FACT SER QL: 11 IU/ML
SODIUM SERPL-SCNC: 140 MMOL/L
WBC # FLD AUTO: 6.25 K/UL

## 2023-10-16 LAB — DSDNA AB SER-ACNC: 26 IU/ML

## 2023-10-18 RX ORDER — NORETHINDRONE ACETATE AND ETHINYL ESTRADIOL AND FERROUS FUMARATE 1.5-30(21)
1.5-3 KIT ORAL
Qty: 84 | Refills: 2 | Status: ACTIVE | COMMUNITY
Start: 2022-04-26 | End: 1900-01-01

## 2024-02-05 ENCOUNTER — APPOINTMENT (OUTPATIENT)
Dept: INTERNAL MEDICINE | Facility: CLINIC | Age: 34
End: 2024-02-05

## 2024-02-05 ENCOUNTER — NON-APPOINTMENT (OUTPATIENT)
Age: 34
End: 2024-02-05

## 2024-02-05 ENCOUNTER — APPOINTMENT (OUTPATIENT)
Dept: INTERNAL MEDICINE | Facility: CLINIC | Age: 34
End: 2024-02-05
Payer: COMMERCIAL

## 2024-02-05 VITALS
HEIGHT: 62 IN | SYSTOLIC BLOOD PRESSURE: 110 MMHG | RESPIRATION RATE: 14 BRPM | DIASTOLIC BLOOD PRESSURE: 64 MMHG | TEMPERATURE: 98 F | BODY MASS INDEX: 27.05 KG/M2 | WEIGHT: 147 LBS

## 2024-02-05 DIAGNOSIS — J45.909 UNSPECIFIED ASTHMA, UNCOMPLICATED: ICD-10-CM

## 2024-02-05 DIAGNOSIS — Z00.00 ENCOUNTER FOR GENERAL ADULT MEDICAL EXAMINATION W/OUT ABNORMAL FINDINGS: ICD-10-CM

## 2024-02-05 PROCEDURE — 99395 PREV VISIT EST AGE 18-39: CPT | Mod: 25

## 2024-02-05 RX ORDER — AMOXICILLIN AND CLAVULANATE POTASSIUM 875; 125 MG/1; MG/1
875-125 TABLET, COATED ORAL
Qty: 20 | Refills: 0 | Status: DISCONTINUED | COMMUNITY
Start: 2023-01-31 | End: 2024-02-05

## 2024-02-05 NOTE — HEALTH RISK ASSESSMENT
[Good] : ~his/her~  mood as  good [No] : No [Little interest or pleasure doing things] : 1) Little interest or pleasure doing things [Feeling down, depressed, or hopeless] : 2) Feeling down, depressed, or hopeless [0] : 2) Feeling down, depressed, or hopeless: Not at all (0) [PHQ-2 Negative - No further assessment needed] : PHQ-2 Negative - No further assessment needed [Patient reported PAP Smear was normal] : Patient reported PAP Smear was normal [With Family] : lives with family [] :  [# Of Children ___] : has [unfilled] children [Feels Safe at Home] : Feels safe at home [Fully functional (bathing, dressing, toileting, transferring, walking, feeding)] : Fully functional (bathing, dressing, toileting, transferring, walking, feeding) [Fully functional (using the telephone, shopping, preparing meals, housekeeping, doing laundry, using] : Fully functional and needs no help or supervision to perform IADLs (using the telephone, shopping, preparing meals, housekeeping, doing laundry, using transportation, managing medications and managing finances) [Never] : Never [[id number]] : 0 [PapSmearDate] : 06/23

## 2024-02-05 NOTE — PLAN
[FreeTextEntry1] : HCM:  - UTD with pap smear  - UTD with vaccines  - Check routine labs, add on pregnancy test   LANCE positive:  - Following with rheumatology, no current symptoms - Advised to f/u with rheum closely given pregnancy status   Asthma:  - well controlled, continue singulair.

## 2024-02-05 NOTE — HISTORY OF PRESENT ILLNESS
[FreeTextEntry1] : CPE  [de-identified] : 33 year old female who presents today for CPE.  She is doing well overall. States that her diet has not been the best and she gained some weight in the past year, but is working on getting it under control.  She found out very recently that she is pregnant. She is only a few weeks along.

## 2024-02-06 ENCOUNTER — TRANSCRIPTION ENCOUNTER (OUTPATIENT)
Age: 34
End: 2024-02-06

## 2024-02-06 LAB
ALBUMIN SERPL ELPH-MCNC: 4.6 G/DL
ALP BLD-CCNC: 50 U/L
ALT SERPL-CCNC: 11 U/L
ANION GAP SERPL CALC-SCNC: 13 MMOL/L
AST SERPL-CCNC: 20 U/L
BASOPHILS # BLD AUTO: 0.07 K/UL
BASOPHILS NFR BLD AUTO: 0.8 %
BILIRUB SERPL-MCNC: 0.5 MG/DL
BUN SERPL-MCNC: 12 MG/DL
CALCIUM SERPL-MCNC: 10 MG/DL
CHLORIDE SERPL-SCNC: 98 MMOL/L
CHOLEST SERPL-MCNC: 179 MG/DL
CO2 SERPL-SCNC: 23 MMOL/L
CREAT SERPL-MCNC: 0.62 MG/DL
EGFR: 121 ML/MIN/1.73M2
EOSINOPHIL # BLD AUTO: 0.6 K/UL
EOSINOPHIL NFR BLD AUTO: 7 %
ESTIMATED AVERAGE GLUCOSE: 97 MG/DL
FOLATE SERPL-MCNC: >20 NG/ML
GLUCOSE SERPL-MCNC: 68 MG/DL
HBA1C MFR BLD HPLC: 5 %
HCG UR QL: POSITIVE
HCT VFR BLD CALC: 40.1 %
HDLC SERPL-MCNC: 61 MG/DL
HGB BLD-MCNC: 12.7 G/DL
IMM GRANULOCYTES NFR BLD AUTO: 0.1 %
LDLC SERPL CALC-MCNC: 109 MG/DL
LYMPHOCYTES # BLD AUTO: 2.98 K/UL
LYMPHOCYTES NFR BLD AUTO: 34.9 %
MAN DIFF?: NORMAL
MCHC RBC-ENTMCNC: 29.1 PG
MCHC RBC-ENTMCNC: 31.7 GM/DL
MCV RBC AUTO: 92 FL
MONOCYTES # BLD AUTO: 0.6 K/UL
MONOCYTES NFR BLD AUTO: 7 %
NEUTROPHILS # BLD AUTO: 4.29 K/UL
NEUTROPHILS NFR BLD AUTO: 50.2 %
NONHDLC SERPL-MCNC: 119 MG/DL
PLATELET # BLD AUTO: 308 K/UL
POTASSIUM SERPL-SCNC: 4.3 MMOL/L
PROT SERPL-MCNC: 7.6 G/DL
RBC # BLD: 4.36 M/UL
RBC # FLD: 14.3 %
SODIUM SERPL-SCNC: 135 MMOL/L
TRIGL SERPL-MCNC: 48 MG/DL
TSH SERPL-ACNC: 0.95 UIU/ML
VIT B12 SERPL-MCNC: 991 PG/ML
WBC # FLD AUTO: 8.55 K/UL

## 2024-02-28 ENCOUNTER — APPOINTMENT (OUTPATIENT)
Dept: OBGYN | Facility: CLINIC | Age: 34
End: 2024-02-28
Payer: COMMERCIAL

## 2024-02-28 ENCOUNTER — NON-APPOINTMENT (OUTPATIENT)
Age: 34
End: 2024-02-28

## 2024-02-28 VITALS
HEART RATE: 81 BPM | SYSTOLIC BLOOD PRESSURE: 129 MMHG | DIASTOLIC BLOOD PRESSURE: 75 MMHG | WEIGHT: 146 LBS | BODY MASS INDEX: 26.87 KG/M2 | HEIGHT: 62 IN

## 2024-02-28 DIAGNOSIS — Z13.1 ENCOUNTER FOR SCREENING FOR DIABETES MELLITUS: ICD-10-CM

## 2024-02-28 DIAGNOSIS — Z13.21 ENCOUNTER FOR SCREENING FOR NUTRITIONAL DISORDER: ICD-10-CM

## 2024-02-28 DIAGNOSIS — Z13.0 ENCOUNTER FOR SCREENING FOR DISEASES OF THE BLOOD AND BLOOD-FORMING ORGANS AND CERTAIN DISORDERS INVOLVING THE IMMUNE MECHANISM: ICD-10-CM

## 2024-02-28 DIAGNOSIS — Z36.9 ENCOUNTER FOR ANTENATAL SCREENING, UNSPECIFIED: ICD-10-CM

## 2024-02-28 DIAGNOSIS — Z01.84 ENCOUNTER FOR ANTIBODY RESPONSE EXAMINATION: ICD-10-CM

## 2024-02-28 DIAGNOSIS — R11.0 NAUSEA: ICD-10-CM

## 2024-02-28 DIAGNOSIS — Z01.83 ENCOUNTER FOR BLOOD TYPING: ICD-10-CM

## 2024-02-28 DIAGNOSIS — Z13.88 ENCOUNTER FOR SCREENING FOR DISORDER DUE TO EXPOSURE TO CONTAMINANTS: ICD-10-CM

## 2024-02-28 DIAGNOSIS — Z11.3 ENCOUNTER FOR SCREENING FOR INFECTIONS WITH A PREDOMINANTLY SEXUAL MODE OF TRANSMISSION: ICD-10-CM

## 2024-02-28 DIAGNOSIS — Z13.29 ENCOUNTER FOR SCREENING FOR OTHER SUSPECTED ENDOCRINE DISORDER: ICD-10-CM

## 2024-02-28 PROCEDURE — 36415 COLL VENOUS BLD VENIPUNCTURE: CPT

## 2024-02-28 PROCEDURE — 76817 TRANSVAGINAL US OBSTETRIC: CPT

## 2024-02-28 PROCEDURE — 99214 OFFICE O/P EST MOD 30 MIN: CPT | Mod: 25

## 2024-02-28 NOTE — COUNSELING
[Vitamins/Supplements] : vitamins/supplements [Nutrition/ Exercise/ Weight Management] : nutrition, exercise, weight management [Drugs/Alcohol] : drugs, alcohol [Contraception/ Emergency Contraception/ Safe Sexual Practices] : contraception, emergency contraception, safe sexual practices [Breast Self Exam] : breast self exam [Preconception Care/ Fertility options] : preconception care, fertility options [Confidentiality] : confidentiality [STD (testing, results, tx)] : STD (testing, results, tx) [Vaccines] : vaccines [Influenza Vaccine] : influenza vaccine [Lab Results] : lab results [Medication Management] : medication management

## 2024-02-28 NOTE — HISTORY OF PRESENT ILLNESS
[FreeTextEntry1] : 33 y/o  LMP 2024 presents with  for amenorrhea visit. c/o occasional nausea, no vomiting. No pelvic pain otr VB. Planned and desired pregnancy. Reports regular monthly menses.   HX of Raynaud's, asthma, environmental allergies,  FT .  [Patient reported PAP Smear was normal] : Patient reported PAP Smear was normal [PapSmeardate] : 5/2023

## 2024-02-28 NOTE — PLAN
[FreeTextEntry1] : SECONDARY AMENORRHEA/EARLY IUP -TVUS today shows early IUP measuring CRL 7w1d, LMP 7w3d. Maintain STAN by LMP 10/13/2024.  - Early pregnancy precautions, practice /hospital info and folder provided to patient.  -NOB blood panel, GCC cx, UCX,  collected and sent at todays office visit.  -Cont PNVs  GENETICS -NO overlap prior Screening  Prior FT   s/p flu vaccine  rto @ 12 weeks for NPN/NT/NIPS

## 2024-02-28 NOTE — PROCEDURE
[GC Chlamydia Culture] : GC Chlamydia Culture [Tolerated Well] : the patient tolerated the procedure well [No Complications] : there were no complications [Transvaginal OB Sonogram] : Transvaginal OB Sonogram [Intrauterine Pregnancy] : intrauterine pregnancy [Yolk Sac] : yolk sac present [Date: ___] : Date: [unfilled] [Fetal Heart] : fetal heart present [Current GA by Sonogram: ___ (wks)] : Current GA by Sonogram: [unfilled]Uwks [___ day(s)] : [unfilled] days [Transvaginal OB Sonogram WNL] : Transvaginal OB Sonogram WNL [FreeTextEntry1] : CRL 7w1d, LMP 7w3d. Maintain STAN by LMP 10/13/2024.

## 2024-02-28 NOTE — PHYSICAL EXAM
[Chaperone Declined] : Patient declined chaperone [Appropriately responsive] : appropriately responsive [Alert] : alert [No Acute Distress] : no acute distress [No Lymphadenopathy] : no lymphadenopathy [Non-tender] : non-tender [Soft] : soft [Non-distended] : non-distended [No HSM] : No HSM [No Lesions] : no lesions [No Mass] : no mass [Oriented x3] : oriented x3 [Labia Majora] : normal [Labia Minora] : normal [Normal] : normal [Enlarged ___ wks] : enlarged [unfilled] ~Uweeks [Uterine Adnexae] : normal

## 2024-02-29 LAB
25(OH)D3 SERPL-MCNC: 31.4 NG/ML
BASOPHILS # BLD AUTO: 0.05 K/UL
BASOPHILS NFR BLD AUTO: 0.9 %
C TRACH RRNA SPEC QL NAA+PROBE: NOT DETECTED
EOSINOPHIL # BLD AUTO: 0.21 K/UL
EOSINOPHIL NFR BLD AUTO: 3.7 %
ESTIMATED AVERAGE GLUCOSE: 97 MG/DL
HBA1C MFR BLD HPLC: 5 %
HBV SURFACE AG SER QL: NONREACTIVE
HCT VFR BLD CALC: 39 %
HCV AB SER QL: NONREACTIVE
HCV S/CO RATIO: 0.14 S/CO
HGB A MFR BLD: 97.3 %
HGB A2 MFR BLD: 2.7 %
HGB BLD-MCNC: 12.3 G/DL
HGB FRACT BLD-IMP: NORMAL
HIV1+2 AB SPEC QL IA.RAPID: NONREACTIVE
IMM GRANULOCYTES NFR BLD AUTO: 0.2 %
LYMPHOCYTES # BLD AUTO: 1.69 K/UL
LYMPHOCYTES NFR BLD AUTO: 29.6 %
MAN DIFF?: NORMAL
MCHC RBC-ENTMCNC: 29.9 PG
MCHC RBC-ENTMCNC: 31.5 GM/DL
MCV RBC AUTO: 94.7 FL
MEV IGG FLD QL IA: 49.4 AU/ML
MEV IGG+IGM SER-IMP: POSITIVE
MONOCYTES # BLD AUTO: 0.49 K/UL
MONOCYTES NFR BLD AUTO: 8.6 %
MUV AB SER-ACNC: POSITIVE
MUV IGG SER QL IA: >300 AU/ML
N GONORRHOEA RRNA SPEC QL NAA+PROBE: NOT DETECTED
NEUTROPHILS # BLD AUTO: 3.26 K/UL
NEUTROPHILS NFR BLD AUTO: 57 %
PLATELET # BLD AUTO: 266 K/UL
RBC # BLD: 4.12 M/UL
RBC # FLD: 14.6 %
RUBV IGG FLD-ACNC: 4.9 INDEX
RUBV IGG SER-IMP: POSITIVE
SOURCE AMPLIFICATION: NORMAL
T PALLIDUM AB SER QL IA: NEGATIVE
TSH SERPL-ACNC: 0.85 UIU/ML
VZV AB TITR SER: POSITIVE
VZV IGG SER IF-ACNC: 575.7 INDEX
WBC # FLD AUTO: 5.71 K/UL

## 2024-03-01 LAB
ABO + RH PNL BLD: NORMAL
BACTERIA UR CULT: NORMAL
BLD GP AB SCN SERPL QL: NORMAL
LEAD BLD-MCNC: <1 UG/DL

## 2024-03-04 LAB
B19V IGG SER QL IA: 6.17 INDEX
B19V IGG+IGM SER-IMP: NORMAL
B19V IGG+IGM SER-IMP: POSITIVE
B19V IGM FLD-ACNC: 0.44 INDEX
B19V IGM SER-ACNC: NEGATIVE

## 2024-03-19 ENCOUNTER — APPOINTMENT (OUTPATIENT)
Dept: RHEUMATOLOGY | Facility: CLINIC | Age: 34
End: 2024-03-19
Payer: COMMERCIAL

## 2024-03-19 ENCOUNTER — LABORATORY RESULT (OUTPATIENT)
Age: 34
End: 2024-03-19

## 2024-03-19 VITALS
RESPIRATION RATE: 18 BRPM | HEART RATE: 66 BPM | SYSTOLIC BLOOD PRESSURE: 105 MMHG | OXYGEN SATURATION: 99 % | BODY MASS INDEX: 26.87 KG/M2 | HEIGHT: 62 IN | DIASTOLIC BLOOD PRESSURE: 71 MMHG | WEIGHT: 146 LBS

## 2024-03-19 PROCEDURE — 99214 OFFICE O/P EST MOD 30 MIN: CPT

## 2024-03-19 NOTE — ADDENDUM
[FreeTextEntry1] : This note was written by Adriana Cross, acting as the  for Dr. Beltre. This note accurately reflects the work and decisions made by Dr. Beltre.

## 2024-03-19 NOTE — ASSESSMENT
[FreeTextEntry1] : Ms. Garcia is a non-smoker who works for [employer] at home with a PMHx of RP (since childhood) and FHx of SLE here to r/o evolving aiCTD  multiple issues to discuss  # pregnancy - 1st trimester  LMP 10.13.23 - due in October -- d/w patient given abnormal serologies would recommend closer f/u through pregnancy  -- advise high risk with frequent early fetal echoes - as ab are positive and could cross placenta even if patient is clinically quiescent  #positive LANCE long h/o RP and strong h/o aiCTD (mom has RA and SLE) - here for evaluation of aiCTD -- no s/s of evolving aiCTD on exam today or by history  -- inflammatory markers - mildly elevated ESR - repeat to confirm -- serologies and sub-serologies positive for LANCE, chromatin and silica, RF.  does not meet classification criteria for aiCTD eg SLE at this time.  however, given extent of serologic positivity and RP would monitor closely for evolution  #RP likely primary -- check serologies/sub- serologies to r/o secondary causes - none by history -- goals of therapy are to improve aol and prevent tissue loss (ie, ulceration, gangrene).   -- potential triggers include cold, stress, smoke) as well as nonpharmacologic measures to help  -- avoidance of cold exposure as well as sudden temperature changes (circumstances that can trigger an attack include air conditioned environments, holding an ice-cold drink, working with cold foods such as making meatballs or washing salad, seasonal changes such as cool rainy days.  -- maintain whole body as well as digital warmth such as hand warmers, socks, layered clothing, hats, thermal underwear.  Can heat up clothing in a dryer and put them on while they are still warm before entering a cold environment. -- avoidance of vasoconstricting drugs when possible such as OTC nasal decongestants, diet pills, amphetamines, some headache meds - discussed sudafed use may worsen her RP - currently she says that it doesn't seem to be doing that but will be vigilant -- t/c pharmacologic therapy should these measures fail or sxs worsen.  More than 50% of the encounter was spent counseling the patient on differential, workup, disease course and treatment/management.  Education was provided to the patient during this encounter.  All questions and concerns were addressed and answered.   The patient verbalized understanding and agreed to the plan.   Patient has been instructed to call for an appointment if new symptoms develop. Patient has been instructed to make a followup appointment in 3 weeks  Time spent on the encounter included, but is not limited to, preparing to see the patient, obtaining and/or reviewing separately obtained history, performing the evaluation, counseling and educating, independently interpreting results with communication to patient, order placement, referring and/or communicating with other health professionals as described, and documenting clinical information in the electronic health record

## 2024-03-19 NOTE — HISTORY OF PRESENT ILLNESS
[___ Week(s) Ago] : [unfilled] week(s) ago [FreeTextEntry1] : Ms. Garcia is a non-smoker who works for [employer] at home with a PMHx of RP (since childhood) and FHx of SLE here to r/o evolving aiCTD  INTERVAL Hx pregnant - 10 weeks now  no rashes, joint pain  has some new left sided back pain - non-specific not reproducible and no sciatica symptoms  has some nausea  background RP - dx as a pre-teen.   Used to be quite severe, that numbness would develop in her fingers even with taking a shower.  Her rheum workup was negative at the time through pediatric rheumatology- though she doesn't remember which serologies were tested and which were positive.   However her mom developed SLE and her PCP subsequently did an LANCE which returned positive.  she is here for further evaluation  - when originally dx with RP, she was given a medication that made her feel warm - she doesn't remember what that pill was, though remembers it was an orange pill with green liquid.  at the time she was unable to swallow pills and so she opened the capsule and swallowed the liquid.  it made her feel warm and she did great on it.  she doesn't remember what kind of pill it was  Rheum ROS  - denies RP, sicca, oral ulcers, rashes, photosensitivity.    - denies skin tightening, ulcerations, nodules - denies constitutional symptoms, fatigue, night sweats.  weight is stable  - breathes well without h/o of pleuritis, pericarditis.  renal function is normal and urine is not frothy

## 2024-03-19 NOTE — PHYSICAL EXAM
[General Appearance - Alert] : alert [General Appearance - In No Acute Distress] : in no acute distress [Abnormal Walk] : normal gait [Nail Clubbing] : no clubbing  or cyanosis of the fingernails [Musculoskeletal - Swelling] : no joint swelling seen [Motor Tone] : muscle strength and tone were normal [Skin Color & Pigmentation] : normal skin color and pigmentation [] : no rash [Skin Turgor] : normal skin turgor [Impaired Insight] : insight and judgment were intact [Oriented To Time, Place, And Person] : oriented to person, place, and time [Affect] : the affect was normal [No CVA Tenderness] : no ~M costovertebral angle tenderness [No Spinal Tenderness] : no spinal tenderness

## 2024-03-20 LAB
ALBUMIN SERPL ELPH-MCNC: 5 G/DL
ALP BLD-CCNC: 54 U/L
ALT SERPL-CCNC: 10 U/L
ANION GAP SERPL CALC-SCNC: 17 MMOL/L
AST SERPL-CCNC: 15 U/L
BILIRUB SERPL-MCNC: 0.4 MG/DL
BUN SERPL-MCNC: 9 MG/DL
C3 SERPL-MCNC: 98 MG/DL
C4 SERPL-MCNC: 46 MG/DL
CALCIUM SERPL-MCNC: 10.1 MG/DL
CHLORIDE SERPL-SCNC: 99 MMOL/L
CO2 SERPL-SCNC: 21 MMOL/L
CREAT SERPL-MCNC: 0.52 MG/DL
CREAT SPEC-SCNC: 204 MG/DL
CREAT/PROT UR: 0.1 RATIO
CRP SERPL-MCNC: 4 MG/L
EGFR: 126 ML/MIN/1.73M2
ENA SS-A AB SER IA-ACNC: <0.2 AL
ENA SS-B AB SER IA-ACNC: <0.2 AL
ERYTHROCYTE [SEDIMENTATION RATE] IN BLOOD BY WESTERGREN METHOD: 22 MM/HR
HCT VFR BLD CALC: 41.4 %
HGB BLD-MCNC: 13.4 G/DL
MCHC RBC-ENTMCNC: 29.8 PG
MCHC RBC-ENTMCNC: 32.4 GM/DL
MCV RBC AUTO: 92.2 FL
PLATELET # BLD AUTO: 287 K/UL
POTASSIUM SERPL-SCNC: 4 MMOL/L
PROT SERPL-MCNC: 8.1 G/DL
PROT UR-MCNC: 18 MG/DL
RBC # BLD: 4.49 M/UL
RBC # FLD: 13.9 %
SODIUM SERPL-SCNC: 137 MMOL/L
WBC # FLD AUTO: 6.87 K/UL

## 2024-03-21 LAB — DSDNA AB SER-ACNC: <12 IU/ML

## 2024-03-28 ENCOUNTER — NON-APPOINTMENT (OUTPATIENT)
Age: 34
End: 2024-03-28

## 2024-03-29 ENCOUNTER — NON-APPOINTMENT (OUTPATIENT)
Age: 34
End: 2024-03-29

## 2024-04-01 ENCOUNTER — TRANSCRIPTION ENCOUNTER (OUTPATIENT)
Age: 34
End: 2024-04-01

## 2024-04-01 LAB
ANTI-BETA2 GLYCOPROTEIN 1 IGG CONCENTRATION: 3 U/ML
ANTI-BETA2 GLYCOPROTEIN 1 IGM CONCENTRATION: 5 U/ML
ANTI-CARDIOLIPIN IGG CONCENTRATION: 2 GPL
ANTI-CARDIOLIPIN IGM CONCENTRATION: 7 MPL
ANTI-CENP IGG CONCENTRATION: 0 U/ML
ANTI-CYCLIC CITRULLINATED PEPTIDE IGG CONCENTRATION: 1 U/ML
ANTI-DOUBLE-STRANDED DNA IGG CONCENTRATION: 32 IU/ML
ANTI-JO-1 IGG CONCENTRATION: 0 U/ML
ANTI-NUCLEAR ANTIBODIES - CYTOPLASMIC PATTERN: NORMAL
ANTI-NUCLEAR ANTIBODIES - PRIMARY NUCLEAR PATTERN: NORMAL
ANTI-NUCLEAR ANTIBODIES - PRIMARY PATTERN TITER: ABNORMAL
ANTI-NUCLEAR ANTIBODIES IGG CONCENTRATION: 79 UNITS
ANTI-RNA POL III IGG CONCENTRATION: <0.7 U/ML
ANTI-RNP70 IGG CONCENTRATION: 3 U/ML
ANTI-RO52 IGG CONCENTRATION: 0 U/ML
ANTI-RO60 IGG CONCENTRATION: 1 U/ML
ANTI-SCL-70 IGG CONCENTRATION: 1 U/ML
ANTI-SMITH IGG CONCENTRATION: 1 U/ML
ANTI-SS-B (LA) IGG CONCENTRATION: 1 U/ML
ANTI-THYROGLOBULIN IGG CONCENTRATION: <12 IU/ML
ANTI-THYROID PEROXIDASE IGG CONCENTRATION: <4 IU/ML
ANTI-U1RNP IGG CONCENTRATION: 2 U/ML
AVISE LUPUS INDEX: 0
AVISE LUPUS RESULT: NEGATIVE
B-LYMPHOCYTE-BOUND C4D (BC4D) LEVEL: 12
ERYTHROCYTE-BOUND C4D (EC4D) LEVEL: 5
RHEUMATOID FACTOR (IGA) CONCENTRATION: 22 IU/ML
RHEUMATOID FACTOR (IGM) CONCENTRATION: 7 IU/ML

## 2024-04-02 ENCOUNTER — NON-APPOINTMENT (OUTPATIENT)
Age: 34
End: 2024-04-02

## 2024-04-02 ENCOUNTER — TRANSCRIPTION ENCOUNTER (OUTPATIENT)
Age: 34
End: 2024-04-02

## 2024-04-03 ENCOUNTER — APPOINTMENT (OUTPATIENT)
Dept: OBGYN | Facility: CLINIC | Age: 34
End: 2024-04-03
Payer: COMMERCIAL

## 2024-04-03 ENCOUNTER — ASOB RESULT (OUTPATIENT)
Age: 34
End: 2024-04-03

## 2024-04-03 PROCEDURE — 0500F INITIAL PRENATAL CARE VISIT: CPT

## 2024-04-03 PROCEDURE — 76813 OB US NUCHAL MEAS 1 GEST: CPT

## 2024-04-03 PROCEDURE — 76801 OB US < 14 WKS SINGLE FETUS: CPT

## 2024-04-03 PROCEDURE — 36415 COLL VENOUS BLD VENIPUNCTURE: CPT

## 2024-04-09 ENCOUNTER — NON-APPOINTMENT (OUTPATIENT)
Age: 34
End: 2024-04-09

## 2024-04-11 ENCOUNTER — TRANSCRIPTION ENCOUNTER (OUTPATIENT)
Age: 34
End: 2024-04-11

## 2024-04-12 ENCOUNTER — TRANSCRIPTION ENCOUNTER (OUTPATIENT)
Age: 34
End: 2024-04-12

## 2024-05-01 ENCOUNTER — APPOINTMENT (OUTPATIENT)
Dept: OBGYN | Facility: CLINIC | Age: 34
End: 2024-05-01
Payer: COMMERCIAL

## 2024-05-01 DIAGNOSIS — N91.1 SECONDARY AMENORRHEA: ICD-10-CM

## 2024-05-01 DIAGNOSIS — O99.511 DISEASES OF THE RESPIRATORY SYSTEM COMPLICATING PREGNANCY, FIRST TRIMESTER: ICD-10-CM

## 2024-05-01 DIAGNOSIS — Z32.01 ENCOUNTER FOR PREGNANCY TEST, RESULT POSITIVE: ICD-10-CM

## 2024-05-01 DIAGNOSIS — Z34.91 ENCOUNTER FOR SUPERVISION OF NORMAL PREGNANCY, UNSPECIFIED, FIRST TRIMESTER: ICD-10-CM

## 2024-05-01 DIAGNOSIS — J45.909 DISEASES OF THE RESPIRATORY SYSTEM COMPLICATING PREGNANCY, FIRST TRIMESTER: ICD-10-CM

## 2024-05-01 PROCEDURE — 36415 COLL VENOUS BLD VENIPUNCTURE: CPT

## 2024-05-01 PROCEDURE — 0502F SUBSEQUENT PRENATAL CARE: CPT

## 2024-05-07 LAB
AFP MOM: 1.49
AFP VALUE: 52.6 NG/ML
ALPHA FETOPROTEIN SERUM COMMENT: NORMAL
ALPHA FETOPROTEIN SERUM INTERPRETATION: NORMAL
ALPHA FETOPROTEIN SERUM RESULTS: NORMAL
ALPHA FETOPROTEIN SERUM TEST RESULTS: NORMAL
GESTATIONAL AGE BASED ON: NORMAL
GESTATIONAL AGE ON COLLECTION DATE: 16.4 WEEKS
INSULIN DEP DIABETES: NORMAL
MATERNAL AGE AT EDD AFP: 33.6 YR
MULTIPLE GESTATION: NO
OSBR RISK 1 IN: 2809
RACE: NORMAL
WEIGHT AFP: 152 LBS

## 2024-05-28 ENCOUNTER — ASOB RESULT (OUTPATIENT)
Age: 34
End: 2024-05-28

## 2024-05-28 ENCOUNTER — APPOINTMENT (OUTPATIENT)
Dept: ANTEPARTUM | Facility: CLINIC | Age: 34
End: 2024-05-28
Payer: COMMERCIAL

## 2024-05-28 PROCEDURE — 76805 OB US >/= 14 WKS SNGL FETUS: CPT

## 2024-05-29 ENCOUNTER — TRANSCRIPTION ENCOUNTER (OUTPATIENT)
Age: 34
End: 2024-05-29

## 2024-05-30 LAB
ALBUMIN SERPL ELPH-MCNC: 3.7 G/DL
ALP BLD-CCNC: 39 U/L
ALT SERPL-CCNC: 14 U/L
ANION GAP SERPL CALC-SCNC: 12 MMOL/L
AST SERPL-CCNC: 21 U/L
BILIRUB SERPL-MCNC: 0.2 MG/DL
BUN SERPL-MCNC: 8 MG/DL
C3 SERPL-MCNC: 88 MG/DL
C4 SERPL-MCNC: 35 MG/DL
CALCIUM SERPL-MCNC: 8.9 MG/DL
CHLORIDE SERPL-SCNC: 101 MMOL/L
CO2 SERPL-SCNC: 23 MMOL/L
CREAT SERPL-MCNC: 0.47 MG/DL
CREAT SPEC-SCNC: 30 MG/DL
CREAT/PROT UR: 0.2 RATIO
CRP SERPL-MCNC: 7 MG/L
DSDNA AB SER-ACNC: <1 IU/ML
EGFR: 129 ML/MIN/1.73M2
ERYTHROCYTE [SEDIMENTATION RATE] IN BLOOD BY WESTERGREN METHOD: 26 MM/HR
HCT VFR BLD CALC: 33.7 %
HGB BLD-MCNC: 11.4 G/DL
MCHC RBC-ENTMCNC: 31.1 PG
MCHC RBC-ENTMCNC: 33.8 GM/DL
MCV RBC AUTO: 91.8 FL
PLATELET # BLD AUTO: 226 K/UL
POTASSIUM SERPL-SCNC: 4.1 MMOL/L
PROT SERPL-MCNC: 6.4 G/DL
PROT UR-MCNC: 6 MG/DL
RBC # BLD: 3.67 M/UL
RBC # FLD: 13.8 %
SODIUM SERPL-SCNC: 137 MMOL/L
WBC # FLD AUTO: 8.11 K/UL

## 2024-06-03 ENCOUNTER — NON-APPOINTMENT (OUTPATIENT)
Age: 34
End: 2024-06-03

## 2024-06-03 ENCOUNTER — APPOINTMENT (OUTPATIENT)
Dept: OBGYN | Facility: CLINIC | Age: 34
End: 2024-06-03
Payer: COMMERCIAL

## 2024-06-03 DIAGNOSIS — O99.512 DISEASES OF THE RESPIRATORY SYSTEM COMPLICATING PREGNANCY, SECOND TRIMESTER: ICD-10-CM

## 2024-06-03 DIAGNOSIS — J45.909 DISEASES OF THE RESPIRATORY SYSTEM COMPLICATING PREGNANCY, SECOND TRIMESTER: ICD-10-CM

## 2024-06-03 PROCEDURE — 36415 COLL VENOUS BLD VENIPUNCTURE: CPT

## 2024-06-03 PROCEDURE — 0502F SUBSEQUENT PRENATAL CARE: CPT

## 2024-06-04 ENCOUNTER — NON-APPOINTMENT (OUTPATIENT)
Age: 34
End: 2024-06-04

## 2024-06-04 ENCOUNTER — TRANSCRIPTION ENCOUNTER (OUTPATIENT)
Age: 34
End: 2024-06-04

## 2024-06-04 LAB
BASOPHILS # BLD AUTO: 0.04 K/UL
BASOPHILS NFR BLD AUTO: 0.5 %
EOSINOPHIL # BLD AUTO: 0.42 K/UL
EOSINOPHIL NFR BLD AUTO: 4.8 %
FERRITIN SERPL-MCNC: 18 NG/ML
FOLATE SERPL-MCNC: 16.1 NG/ML
HCT VFR BLD CALC: 34 %
HGB BLD-MCNC: 10.9 G/DL
IMM GRANULOCYTES NFR BLD AUTO: 0.5 %
LYMPHOCYTES # BLD AUTO: 1.5 K/UL
LYMPHOCYTES NFR BLD AUTO: 17 %
MAN DIFF?: NORMAL
MCHC RBC-ENTMCNC: 30.9 PG
MCHC RBC-ENTMCNC: 32.1 GM/DL
MCV RBC AUTO: 96.3 FL
MONOCYTES # BLD AUTO: 0.59 K/UL
MONOCYTES NFR BLD AUTO: 6.7 %
NEUTROPHILS # BLD AUTO: 6.23 K/UL
NEUTROPHILS NFR BLD AUTO: 70.5 %
PLATELET # BLD AUTO: 225 K/UL
RBC # BLD: 3.53 M/UL
RBC # FLD: 14.6 %
VIT B12 SERPL-MCNC: 581 PG/ML
WBC # FLD AUTO: 8.82 K/UL

## 2024-06-05 ENCOUNTER — APPOINTMENT (OUTPATIENT)
Dept: RHEUMATOLOGY | Facility: CLINIC | Age: 34
End: 2024-06-05
Payer: COMMERCIAL

## 2024-06-05 VITALS
HEART RATE: 86 BPM | WEIGHT: 160 LBS | HEIGHT: 62 IN | SYSTOLIC BLOOD PRESSURE: 119 MMHG | BODY MASS INDEX: 29.44 KG/M2 | TEMPERATURE: 97.5 F | DIASTOLIC BLOOD PRESSURE: 74 MMHG | OXYGEN SATURATION: 99 %

## 2024-06-05 DIAGNOSIS — R76.8 OTHER SPECIFIED ABNORMAL IMMUNOLOGICAL FINDINGS IN SERUM: ICD-10-CM

## 2024-06-05 DIAGNOSIS — I73.00 RAYNAUD'S SYNDROME W/OUT GANGRENE: ICD-10-CM

## 2024-06-05 DIAGNOSIS — R70.0 ELEVATED ERYTHROCYTE SEDIMENTATION RATE: ICD-10-CM

## 2024-06-05 DIAGNOSIS — Z34.92 ENCOUNTER FOR SUPERVISION OF NORMAL PREGNANCY, UNSPECIFIED, SECOND TRIMESTER: ICD-10-CM

## 2024-06-05 DIAGNOSIS — O99.019 ANEMIA COMPLICATING PREGNANCY, UNSPECIFIED TRIMESTER: ICD-10-CM

## 2024-06-05 DIAGNOSIS — R76.0 RAISED ANTIBODY TITER: ICD-10-CM

## 2024-06-05 DIAGNOSIS — Z31.9 ENCOUNTER FOR PROCREATIVE MANAGEMENT, UNSPECIFIED: ICD-10-CM

## 2024-06-05 DIAGNOSIS — O09.90 SUPERVISION OF HIGH RISK PREGNANCY, UNSPECIFIED, UNSPECIFIED TRIMESTER: ICD-10-CM

## 2024-06-05 PROCEDURE — G2211 COMPLEX E/M VISIT ADD ON: CPT

## 2024-06-05 PROCEDURE — 99214 OFFICE O/P EST MOD 30 MIN: CPT

## 2024-06-05 RX ORDER — LEVOCETIRIZINE DIHYDROCHLORIDE 5 MG/1
TABLET ORAL
Refills: 0 | Status: ACTIVE | COMMUNITY

## 2024-06-05 RX ORDER — ASPIRIN 81 MG
81 TABLET, DELAYED RELEASE (ENTERIC COATED) ORAL
Refills: 0 | Status: ACTIVE | COMMUNITY

## 2024-06-06 ENCOUNTER — TRANSCRIPTION ENCOUNTER (OUTPATIENT)
Age: 34
End: 2024-06-06

## 2024-06-06 PROBLEM — Z31.9 DESIRE FOR PREGNANCY: Status: RESOLVED | Noted: 2023-04-23 | Resolved: 2024-06-06

## 2024-06-06 PROBLEM — O09.90 HIGH RISK PREGNANCY, ANTEPARTUM: Status: ACTIVE | Noted: 2024-04-03

## 2024-06-06 PROBLEM — R70.0 SEDIMENTATION RATE ELEVATION: Status: ACTIVE | Noted: 2023-04-21

## 2024-06-06 PROBLEM — Z34.92 SECOND TRIMESTER PREGNANCY: Status: ACTIVE | Noted: 2024-05-01

## 2024-06-06 PROBLEM — R76.0 ABNORMAL ANTIBODY TITER: Status: ACTIVE | Noted: 2024-06-06

## 2024-06-06 PROBLEM — I73.00 RAYNAUDS PHENOMENON: Status: ACTIVE | Noted: 2021-11-22

## 2024-06-06 LAB
BASOPHILS # BLD AUTO: 0.04 K/UL
BASOPHILS NFR BLD AUTO: 0.4 %
EOSINOPHIL # BLD AUTO: 0.4 K/UL
EOSINOPHIL NFR BLD AUTO: 4.2 %
HAPTOGLOB SERPL-MCNC: 117 MG/DL
HCT VFR BLD CALC: 33.8 %
HGB BLD-MCNC: 11.2 G/DL
IMM GRANULOCYTES NFR BLD AUTO: 0.3 %
LYMPHOCYTES # BLD AUTO: 1.82 K/UL
LYMPHOCYTES NFR BLD AUTO: 19 %
MAN DIFF?: NORMAL
MCHC RBC-ENTMCNC: 31.4 PG
MCHC RBC-ENTMCNC: 33.1 GM/DL
MCV RBC AUTO: 94.7 FL
MONOCYTES # BLD AUTO: 0.7 K/UL
MONOCYTES NFR BLD AUTO: 7.3 %
NEUTROPHILS # BLD AUTO: 6.61 K/UL
NEUTROPHILS NFR BLD AUTO: 68.8 %
PLATELET # BLD AUTO: 233 K/UL
RBC # BLD: 3.57 M/UL
RBC # FLD: 14.5 %
WBC # FLD AUTO: 9.6 K/UL

## 2024-06-06 NOTE — PHYSICAL EXAM
[General Appearance - Alert] : alert [General Appearance - In No Acute Distress] : in no acute distress [Skin Color & Pigmentation] : normal skin color and pigmentation [Skin Turgor] : normal skin turgor [] : no rash [Oriented To Time, Place, And Person] : oriented to person, place, and time [Impaired Insight] : insight and judgment were intact [Affect] : the affect was normal [Abnormal Walk] : normal gait [Nail Clubbing] : no clubbing  or cyanosis of the fingernails [Musculoskeletal - Swelling] : no joint swelling seen [Motor Tone] : muscle strength and tone were normal [Edema] : there was no peripheral edema [FreeTextEntry1] : no RP, no LR

## 2024-06-06 NOTE — ASSESSMENT
[FreeTextEntry1] : Ms. Garcia is a non-smoker who works for [employer] at home with a PMHx of RP (since childhood) and FHx of SLE here to r/o evolving aiCTD  - last period was 01/07/2024  # anemia  new anemia  -- recheck  -- will add on haptoglobin give normal indices and seorlogies - though no other s/s of aiCTD -- f/u heme  # pregnancy - 2nd trimester  LMP 10.13.23 - due in October -- given abnormal serologies we are having a closer f/u through pregnancy  -- advise high risk with frequent early fetal echoes - as ab are positive and could cross placenta even if patient is clinically quiescent - thus far all is good  #positive LANCE long h/o RP and strong h/o aiCTD (mom has RA and SLE) - here for evaluation of aiCTD -- no s/s of evolving aiCTD on exam today or by history  -- inflammatory markers - mildly elevated ESR - repeat to confirm -- serologies and sub-serologies positive for LANCE, chromatin and silica, RF.  does not meet classification criteria for aiCTD eg SLE at this time.  however, given extent of serologic positivity and RP would monitor closely for evolution  #RP likely primary -- goals of therapy are to improve aol and prevent tissue loss (ie, ulceration, gangrene).   -- t/c pharmacologic therapy should these measures fail or sxs worsen.   More than 50% of the encounter was spent counseling the patient on differential, workup, disease course and treatment/management.  Education was provided to the patient during this encounter.  All questions and concerns were addressed and answered.   The patient verbalized understanding and agreed to the plan.   Patient has been instructed to call for an appointment if new symptoms develop. Patient has been instructed to make a follow-up appointment in 3 months  Time spent on the encounter included, but is not limited to, preparing to see the patient, obtaining and/or reviewing separately obtained history, performing the evaluation, counseling and educating, independently interpreting results with communication to patient, order placement, referring and/or communicating with other health professionals as described, and documenting clinical information in the electronic health record

## 2024-06-19 NOTE — HISTORY OF PRESENT ILLNESS
[Time Spent: ___ minutes] : I have spent [unfilled] minutes of time on the encounter. [___ Month(s) Ago] : [unfilled] month(s) ago [FreeTextEntry1] : Ms. Garcia is a non-smoker who works for [employer] at home with a PMHx of RP (since childhood) and FHx of SLE here to r/o evolving aiCTD and for a follow-up visit to go over the results of her recent labs. The patient is 21 weeks pregnant.  INTERVAL HISTORY  in terms of the baby and pregnancy - the patient states she just had her anatomy scan, and everything looked great - she states her hematocrit levels were elevated so she would like to check it again today - no rashes  in terms of her rheuamtologic issues - her hands feel good - states her back pain in her low back started acting up again so she now wears her back brace to work - has a history of previous pregnancies with no issues or complications - states she plans on working up until she gives birth - no edema of the lower extremities - wears compression socks 1-2 times until the temperature drops again in the fall - states she is due October 13, 2024 - states she feels big and hot - last birth she has to be induced when her son wouldn't come out after 41 weeks - has an appointment to see a hematologist 07/08/2024 - the patient works as a physical therapist for [employer] - last period was 01/07/2024  -----------------------------------------------------------------------------------  background RP - dx as a pre-teen.   Used to be quite severe, that numbness would develop in her fingers even with taking a shower.  Her rheum workup was negative at the time through pediatric rheumatology- though she doesn't remember which serologies were tested and which were positive. However, her mom developed SLE and her PCP subsequently did an LANCE which returned positive.  she is here for further evaluation  - when originally dx with RP, she was given a medication that made her feel warm - she doesn't remember what that pill was, though remembers it was an orange pill with green liquid.  at the time she was unable to swallow pills and so she opened the capsule and swallowed the liquid.  it made her feel warm and she did great on it.  she doesn't remember what kind of pill it was  Rheum ROS  - denies RP, sicca, oral ulcers, rashes, photosensitivity.    - denies skin tightening, ulcerations, nodules - denies constitutional symptoms, fatigue, night sweats.  weight is stable  - breathes well without h/o of pleuritis, pericarditis.  renal function is normal, and urine is not frothy

## 2024-07-03 ENCOUNTER — OUTPATIENT (OUTPATIENT)
Dept: OUTPATIENT SERVICES | Facility: HOSPITAL | Age: 34
LOS: 1 days | Discharge: HOME | End: 2024-07-03

## 2024-07-03 DIAGNOSIS — D64.9 ANEMIA, UNSPECIFIED: ICD-10-CM

## 2024-07-08 ENCOUNTER — APPOINTMENT (OUTPATIENT)
Dept: HEMATOLOGY ONCOLOGY | Facility: CLINIC | Age: 34
End: 2024-07-08

## 2024-07-08 ENCOUNTER — RESULT REVIEW (OUTPATIENT)
Age: 34
End: 2024-07-08

## 2024-07-08 VITALS
WEIGHT: 171.96 LBS | TEMPERATURE: 99 F | RESPIRATION RATE: 17 BRPM | HEIGHT: 61.93 IN | BODY MASS INDEX: 31.64 KG/M2 | OXYGEN SATURATION: 97 % | DIASTOLIC BLOOD PRESSURE: 60 MMHG | SYSTOLIC BLOOD PRESSURE: 95 MMHG | HEART RATE: 73 BPM

## 2024-07-08 LAB
BASOPHILS # BLD AUTO: 0.03 K/UL — SIGNIFICANT CHANGE UP (ref 0–0.2)
BASOPHILS NFR BLD AUTO: 0.3 % — SIGNIFICANT CHANGE UP (ref 0–2)
EOSINOPHIL # BLD AUTO: 0.28 K/UL — SIGNIFICANT CHANGE UP (ref 0–0.5)
EOSINOPHIL NFR BLD AUTO: 2.7 % — SIGNIFICANT CHANGE UP (ref 0–6)
HCT VFR BLD CALC: 32.8 % — LOW (ref 34.5–45)
HGB BLD-MCNC: 11.1 G/DL — LOW (ref 11.5–15.5)
IMM GRANULOCYTES NFR BLD AUTO: 0.9 % — SIGNIFICANT CHANGE UP (ref 0–0.9)
LYMPHOCYTES # BLD AUTO: 1.98 K/UL — SIGNIFICANT CHANGE UP (ref 1–3.3)
LYMPHOCYTES # BLD AUTO: 19.2 % — SIGNIFICANT CHANGE UP (ref 13–44)
MCHC RBC-ENTMCNC: 31.9 PG — SIGNIFICANT CHANGE UP (ref 27–34)
MCHC RBC-ENTMCNC: 33.8 G/DL — SIGNIFICANT CHANGE UP (ref 32–36)
MCV RBC AUTO: 94.3 FL — SIGNIFICANT CHANGE UP (ref 80–100)
MONOCYTES # BLD AUTO: 0.7 K/UL — SIGNIFICANT CHANGE UP (ref 0–0.9)
MONOCYTES NFR BLD AUTO: 6.8 % — SIGNIFICANT CHANGE UP (ref 2–14)
NEUTROPHILS # BLD AUTO: 7.25 K/UL — SIGNIFICANT CHANGE UP (ref 1.8–7.4)
NEUTROPHILS NFR BLD AUTO: 70.1 % — SIGNIFICANT CHANGE UP (ref 43–77)
NRBC # BLD: 0 /100 WBCS — SIGNIFICANT CHANGE UP (ref 0–0)
PLATELET # BLD AUTO: 192 K/UL — SIGNIFICANT CHANGE UP (ref 150–400)
RBC # BLD: 3.48 M/UL — LOW (ref 3.8–5.2)
RBC # FLD: 13.9 % — SIGNIFICANT CHANGE UP (ref 10.3–14.5)
RETICS #: 96.4 K/UL — SIGNIFICANT CHANGE UP (ref 25–125)
RETICS/RBC NFR: 2.8 % — HIGH (ref 0.5–2.5)
WBC # BLD: 10.33 K/UL — SIGNIFICANT CHANGE UP (ref 3.8–10.5)
WBC # FLD AUTO: 10.33 K/UL — SIGNIFICANT CHANGE UP (ref 3.8–10.5)

## 2024-07-08 PROCEDURE — 99204 OFFICE O/P NEW MOD 45 MIN: CPT

## 2024-07-09 ENCOUNTER — NON-APPOINTMENT (OUTPATIENT)
Age: 34
End: 2024-07-09

## 2024-07-09 RX ORDER — FERROUS GLUCONATE 324(38)MG
324 (38 FE) TABLET ORAL DAILY
Qty: 90 | Refills: 1 | Status: ACTIVE | COMMUNITY
Start: 2024-07-09 | End: 1900-01-01

## 2024-07-12 ENCOUNTER — TRANSCRIPTION ENCOUNTER (OUTPATIENT)
Age: 34
End: 2024-07-12

## 2024-07-13 LAB
ALBUMIN SERPL ELPH-MCNC: 3.6 G/DL
ALP BLD-CCNC: 47 U/L
ALT SERPL-CCNC: 14 U/L
ANION GAP SERPL CALC-SCNC: 15 MMOL/L
AST SERPL-CCNC: 20 U/L
BILIRUB SERPL-MCNC: 0.2 MG/DL
BUN SERPL-MCNC: 7 MG/DL
CALCIUM SERPL-MCNC: 9.4 MG/DL
CHLORIDE SERPL-SCNC: 100 MMOL/L
CO2 SERPL-SCNC: 20 MMOL/L
CREAT SERPL-MCNC: 0.4 MG/DL
EGFR: 134 ML/MIN/1.73M2
FERRITIN SERPL-MCNC: 12 NG/ML
FOLATE SERPL-MCNC: 18.4 NG/ML
GLUCOSE SERPL-MCNC: 117 MG/DL
HAPTOGLOB SERPL-MCNC: 143 MG/DL
HGB A MFR BLD: 97.3 %
HGB A2 MFR BLD: 2.7 %
HGB FRACT BLD-IMP: NORMAL
IF BLOCK AB SER QL: 1.1 AU/ML
IRON SATN MFR SERPL: 14 %
IRON SERPL-MCNC: 74 UG/DL
LDH SERPL-CCNC: 215 U/L
PCA AB SER QL IF: NORMAL
POTASSIUM SERPL-SCNC: 3.6 MMOL/L
PROT SERPL-MCNC: 6.2 G/DL
SODIUM SERPL-SCNC: 134 MMOL/L
TIBC SERPL-MCNC: 531 UG/DL
UIBC SERPL-MCNC: 457 UG/DL

## 2024-07-15 ENCOUNTER — NON-APPOINTMENT (OUTPATIENT)
Age: 34
End: 2024-07-15

## 2024-07-15 ENCOUNTER — TRANSCRIPTION ENCOUNTER (OUTPATIENT)
Age: 34
End: 2024-07-15

## 2024-07-17 ENCOUNTER — NON-APPOINTMENT (OUTPATIENT)
Age: 34
End: 2024-07-17

## 2024-07-17 ENCOUNTER — APPOINTMENT (OUTPATIENT)
Dept: OBGYN | Facility: CLINIC | Age: 34
End: 2024-07-17
Payer: COMMERCIAL

## 2024-07-17 DIAGNOSIS — O99.019 ANEMIA COMPLICATING PREGNANCY, UNSPECIFIED TRIMESTER: ICD-10-CM

## 2024-07-17 DIAGNOSIS — Z34.93 ENCOUNTER FOR SUPERVISION OF NORMAL PREGNANCY, UNSPECIFIED, THIRD TRIMESTER: ICD-10-CM

## 2024-07-17 LAB — METHYLMALONATE SERPL-SCNC: 146 NMOL/L

## 2024-07-17 PROCEDURE — 36415 COLL VENOUS BLD VENIPUNCTURE: CPT

## 2024-07-17 PROCEDURE — 0502F SUBSEQUENT PRENATAL CARE: CPT

## 2024-07-18 ENCOUNTER — NON-APPOINTMENT (OUTPATIENT)
Age: 34
End: 2024-07-18

## 2024-07-18 LAB
25(OH)D3 SERPL-MCNC: 34 NG/ML
BASOPHILS # BLD AUTO: 0.04 K/UL
BASOPHILS NFR BLD AUTO: 0.6 %
BLD GP AB SCN SERPL QL: NORMAL
EOSINOPHIL # BLD AUTO: 0.23 K/UL
EOSINOPHIL NFR BLD AUTO: 3.3 %
FERRITIN SERPL-MCNC: 25 NG/ML
GLUCOSE 1H P 50 G GLC PO SERPL-MCNC: 226 MG/DL
HCT VFR BLD CALC: 34.4 %
HCV AB SER QL: NONREACTIVE
HCV S/CO RATIO: 0.1 S/CO
HGB BLD-MCNC: 11.2 G/DL
HIV1+2 AB SPEC QL IA.RAPID: NONREACTIVE
IMM GRANULOCYTES NFR BLD AUTO: 0.6 %
LYMPHOCYTES # BLD AUTO: 1.45 K/UL
LYMPHOCYTES NFR BLD AUTO: 20.6 %
MAN DIFF?: NORMAL
MCHC RBC-ENTMCNC: 32 PG
MCHC RBC-ENTMCNC: 32.6 GM/DL
MCV RBC AUTO: 98.3 FL
MONOCYTES # BLD AUTO: 0.43 K/UL
MONOCYTES NFR BLD AUTO: 6.1 %
NEUTROPHILS # BLD AUTO: 4.84 K/UL
NEUTROPHILS NFR BLD AUTO: 68.8 %
PLATELET # BLD AUTO: 187 K/UL
RBC # BLD: 3.5 M/UL
RBC # FLD: 14.4 %
T PALLIDUM AB SER QL IA: NEGATIVE
WBC # FLD AUTO: 7.03 K/UL

## 2024-07-25 ENCOUNTER — APPOINTMENT (OUTPATIENT)
Dept: MATERNAL FETAL MEDICINE | Facility: CLINIC | Age: 34
End: 2024-07-25
Payer: COMMERCIAL

## 2024-07-25 ENCOUNTER — ASOB RESULT (OUTPATIENT)
Age: 34
End: 2024-07-25

## 2024-07-25 DIAGNOSIS — O24.419 GESTATIONAL DIABETES MELLITUS IN PREGNANCY, UNSPECIFIED CONTROL: ICD-10-CM

## 2024-07-25 PROCEDURE — G0108 DIAB MANAGE TRN  PER INDIV: CPT | Mod: 95

## 2024-07-25 RX ORDER — BLOOD-GLUCOSE METER
W/DEVICE KIT MISCELLANEOUS
Qty: 1 | Refills: 0 | Status: ACTIVE | COMMUNITY
Start: 2024-07-25 | End: 1900-01-01

## 2024-07-25 RX ORDER — LANCETS 33 GAUGE
EACH MISCELLANEOUS
Qty: 4 | Refills: 1 | Status: ACTIVE | COMMUNITY
Start: 2024-07-25 | End: 1900-01-01

## 2024-07-25 RX ORDER — BLOOD-GLUCOSE METER
KIT MISCELLANEOUS 4 TIMES DAILY
Qty: 4 | Refills: 2 | Status: ACTIVE | COMMUNITY
Start: 2024-07-25 | End: 1900-01-01

## 2024-07-25 RX ORDER — URINE ACETONE TEST STRIPS
STRIP MISCELLANEOUS
Qty: 1 | Refills: 0 | Status: ACTIVE | COMMUNITY
Start: 2024-07-25 | End: 1900-01-01

## 2024-07-26 ENCOUNTER — NON-APPOINTMENT (OUTPATIENT)
Age: 34
End: 2024-07-26

## 2024-07-26 RX ORDER — ISOPROPYL ALCOHOL 0.7 ML/ML
SWAB TOPICAL
Qty: 1 | Refills: 0 | Status: ACTIVE | COMMUNITY
Start: 2024-07-26 | End: 1900-01-01

## 2024-07-26 RX ORDER — ELECTROLYTES/DEXTROSE
32G X 4 MM SOLUTION, ORAL ORAL
Qty: 1 | Refills: 1 | Status: ACTIVE | COMMUNITY
Start: 2024-07-26 | End: 1900-01-01

## 2024-07-29 ENCOUNTER — APPOINTMENT (OUTPATIENT)
Dept: OBGYN | Facility: CLINIC | Age: 34
End: 2024-07-29
Payer: COMMERCIAL

## 2024-07-29 ENCOUNTER — NON-APPOINTMENT (OUTPATIENT)
Age: 34
End: 2024-07-29

## 2024-07-29 ENCOUNTER — ASOB RESULT (OUTPATIENT)
Age: 34
End: 2024-07-29

## 2024-07-29 DIAGNOSIS — Z23 ENCOUNTER FOR IMMUNIZATION: ICD-10-CM

## 2024-07-29 PROCEDURE — 76816 OB US FOLLOW-UP PER FETUS: CPT

## 2024-07-29 PROCEDURE — 90471 IMMUNIZATION ADMIN: CPT

## 2024-07-29 PROCEDURE — 0502F SUBSEQUENT PRENATAL CARE: CPT

## 2024-07-29 PROCEDURE — 90715 TDAP VACCINE 7 YRS/> IM: CPT

## 2024-07-30 ENCOUNTER — APPOINTMENT (OUTPATIENT)
Dept: MATERNAL FETAL MEDICINE | Facility: CLINIC | Age: 34
End: 2024-07-30
Payer: COMMERCIAL

## 2024-07-30 ENCOUNTER — ASOB RESULT (OUTPATIENT)
Age: 34
End: 2024-07-30

## 2024-07-30 PROCEDURE — G0108 DIAB MANAGE TRN  PER INDIV: CPT | Mod: 95

## 2024-07-30 RX ORDER — INSULIN GLARGINE 100 [IU]/ML
100 INJECTION, SOLUTION SUBCUTANEOUS
Qty: 1 | Refills: 1 | Status: ACTIVE | COMMUNITY
Start: 2024-07-26 | End: 1900-01-01

## 2024-07-30 RX ORDER — INSULIN LISPRO 100 [IU]/ML
100 INJECTION, SOLUTION INTRAVENOUS; SUBCUTANEOUS
Qty: 1 | Refills: 0 | Status: ACTIVE | COMMUNITY
Start: 2024-07-29 | End: 1900-01-01

## 2024-07-31 ENCOUNTER — TRANSCRIPTION ENCOUNTER (OUTPATIENT)
Age: 34
End: 2024-07-31

## 2024-07-31 RX ORDER — BLOOD SUGAR DIAGNOSTIC
STRIP MISCELLANEOUS
Qty: 400 | Refills: 0 | Status: ACTIVE | COMMUNITY
Start: 2024-07-31 | End: 1900-01-01

## 2024-07-31 RX ORDER — BLOOD-GLUCOSE CONTROL, NORMAL
EACH MISCELLANEOUS
Qty: 1 | Refills: 0 | Status: ACTIVE | COMMUNITY
Start: 2024-07-31 | End: 1900-01-01

## 2024-07-31 RX ORDER — BLOOD-GLUCOSE METER
W/DEVICE EACH MISCELLANEOUS
Qty: 1 | Refills: 0 | Status: ACTIVE | COMMUNITY
Start: 2024-07-31 | End: 1900-01-01

## 2024-07-31 RX ORDER — LANCETS 33 GAUGE
EACH MISCELLANEOUS
Qty: 400 | Refills: 0 | Status: ACTIVE | COMMUNITY
Start: 2024-07-31 | End: 1900-01-01

## 2024-08-01 ENCOUNTER — LABORATORY RESULT (OUTPATIENT)
Age: 34
End: 2024-08-01

## 2024-08-01 ENCOUNTER — NON-APPOINTMENT (OUTPATIENT)
Age: 34
End: 2024-08-01

## 2024-08-05 ENCOUNTER — NON-APPOINTMENT (OUTPATIENT)
Age: 34
End: 2024-08-05

## 2024-08-06 ENCOUNTER — APPOINTMENT (OUTPATIENT)
Dept: RHEUMATOLOGY | Facility: CLINIC | Age: 34
End: 2024-08-06

## 2024-08-06 PROCEDURE — 99213 OFFICE O/P EST LOW 20 MIN: CPT

## 2024-08-06 PROCEDURE — G2211 COMPLEX E/M VISIT ADD ON: CPT

## 2024-08-08 ENCOUNTER — APPOINTMENT (OUTPATIENT)
Dept: MATERNAL FETAL MEDICINE | Facility: CLINIC | Age: 34
End: 2024-08-08

## 2024-08-08 ENCOUNTER — ASOB RESULT (OUTPATIENT)
Age: 34
End: 2024-08-08

## 2024-08-08 PROCEDURE — G0108 DIAB MANAGE TRN  PER INDIV: CPT | Mod: 95

## 2024-08-09 PROBLEM — Z13.21 ENCOUNTER FOR VITAMIN DEFICIENCY SCREENING: Status: RESOLVED | Noted: 2024-02-28 | Resolved: 2024-08-09

## 2024-08-09 PROBLEM — Z13.0 SCREENING FOR DEFICIENCY ANEMIA: Status: RESOLVED | Noted: 2024-02-28 | Resolved: 2024-08-09

## 2024-08-09 PROBLEM — Z01.83 ENCOUNTER FOR RH BLOOD TYPING: Status: RESOLVED | Noted: 2024-02-28 | Resolved: 2024-08-09

## 2024-08-09 PROBLEM — Z98.890 HISTORY OF BEING SCREENED FOR LEAD EXPOSURE: Status: RESOLVED | Noted: 2024-02-28 | Resolved: 2024-08-09

## 2024-08-09 PROBLEM — Z01.84 IMMUNITY STATUS TESTING: Status: RESOLVED | Noted: 2024-02-28 | Resolved: 2024-08-09

## 2024-08-09 PROBLEM — Z11.3 ROUTINE SCREENING FOR STI (SEXUALLY TRANSMITTED INFECTION): Status: RESOLVED | Noted: 2024-02-28 | Resolved: 2024-08-09

## 2024-08-09 PROBLEM — R11.0 NAUSEA WITHOUT VOMITING: Status: RESOLVED | Noted: 2024-02-28 | Resolved: 2024-08-09

## 2024-08-09 PROBLEM — Z34.92 SECOND TRIMESTER PREGNANCY: Status: RESOLVED | Noted: 2024-05-01 | Resolved: 2024-08-09

## 2024-08-09 PROBLEM — Z13.29 SCREENING FOR THYROID DISORDER: Status: RESOLVED | Noted: 2024-02-28 | Resolved: 2024-08-09

## 2024-08-09 PROBLEM — Z87.898 HISTORY OF PALPITATIONS: Status: RESOLVED | Noted: 2021-07-13 | Resolved: 2024-08-09

## 2024-08-09 NOTE — HISTORY OF PRESENT ILLNESS
[___ Week(s) Ago] : [unfilled] week(s) ago [FreeTextEntry1] : Ms. Garcia is a non-smoker who works for [employer] at home with a PMHx of RP (since childhood) and FHx of SLE here to r/o evolving aiCTD  INTERVAL HX -October 13th due date, in 3rd trimester -Gestational diabetes dx but not on insulin - well controlled -Low back pain and stiffness but otherwise ok -Denies any joint pain or swelling - Improved iron level and ferritin on Fe supplement   ----------------- INTERVAL Hx pregnant - 10 weeks now  no rashes, joint pain  has some new left sided back pain - non-specific not reproducible and no sciatica symptoms  has some nausea  background RP - dx as a pre-teen.   Used to be quite severe, that numbness would develop in her fingers even with taking a shower.  Her rheum workup was negative at the time through pediatric rheumatology- though she doesn't remember which serologies were tested and which were positive.   However her mom developed SLE and her PCP subsequently did an LANCE which returned positive.  she is here for further evaluation  - when originally dx with RP, she was given a medication that made her feel warm - she doesn't remember what that pill was, though remembers it was an orange pill with green liquid.  at the time she was unable to swallow pills and so she opened the capsule and swallowed the liquid.  it made her feel warm and she did great on it.  she doesn't remember what kind of pill it was  Rheum ROS  - denies RP, sicca, oral ulcers, rashes, photosensitivity.    - denies skin tightening, ulcerations, nodules - denies constitutional symptoms, fatigue, night sweats.  weight is stable  - breathes well without h/o of pleuritis, pericarditis.  renal function is normal and urine is not frothy

## 2024-08-09 NOTE — HISTORY OF PRESENT ILLNESS
[___ Week(s) Ago] : [unfilled] week(s) ago [FreeTextEntry1] : Ms. Garcia is a non-smoker who works for [employer] at home with a PMHx of RP (since childhood) and FHx of SLE here to r/o evolving aiCTD  INTERVAL HX -October 13th due date, in 3rd trimester -Gestational diabetes dx but not on insulin - well controlled -Low back pain and stiffness but otherwise ok -Denies any joint pain or swelling - Improved iron level and ferritin on Fe supplement   ----------------- INTERVAL Hx pregnant - 10 weeks now  no rashes, joint pain  has some new left sided back pain - non-specific not reproducible and no sciatica symptoms  has some nausea  background RP - dx as a pre-teen.   Used to be quite severe, that numbness would develop in her fingers even with taking a shower.  Her rheum workup was negative at the time through pediatric rheumatology- though she doesn't remember which serologies were tested and which were positive.   However her mom developed SLE and her PCP subsequently did an LANCE which returned positive.  she is here for further evaluation  - when originally dx with RP, she was given a medication that made her feel warm - she doesn't remember what that pill was, though remembers it was an orange pill with green liquid.  at the time she was unable to swallow pills and so she opened the capsule and swallowed the liquid.  it made her feel warm and she did great on it.  she doesn't remember what kind of pill it was  Rheum ROS  - denies RP, sicca, oral ulcers, rashes, photosensitivity.    - denies skin tightening, ulcerations, nodules - denies constitutional symptoms, fatigue, night sweats.  weight is stable  - breathes well without h/o of pleuritis, pericarditis.  renal function is normal and urine is not frothy

## 2024-08-09 NOTE — PHYSICAL EXAM
[General Appearance - Alert] : alert [General Appearance - In No Acute Distress] : in no acute distress [No CVA Tenderness] : no ~M costovertebral angle tenderness [No Spinal Tenderness] : no spinal tenderness [Skin Color & Pigmentation] : normal skin color and pigmentation [Skin Turgor] : normal skin turgor [Oriented To Time, Place, And Person] : oriented to person, place, and time [Impaired Insight] : insight and judgment were intact [Affect] : the affect was normal [General Appearance - Well Nourished] : well nourished [General Appearance - Well Developed] : well developed [General Appearance - Well-Appearing] : healthy appearing [Sclera] : the sclera and conjunctiva were normal [Extraocular Movements] : extraocular movements were intact [Outer Ear] : the ears and nose were normal in appearance [] : the neck was supple [Respiration, Rhythm And Depth] : normal respiratory rhythm and effort [Exaggerated Use Of Accessory Muscles For Inspiration] : no accessory muscle use [Auscultation Breath Sounds / Voice Sounds] : lungs were clear to auscultation bilaterally [Heart Rate And Rhythm] : heart rate was normal and rhythm regular [Heart Sounds] : normal S1 and S2 [Heart Sounds Gallop] : no gallops [Murmurs] : no murmurs [Heart Sounds Pericardial Friction Rub] : no pericardial rub [Edema] : there was no peripheral edema [Cervical Lymph Nodes Enlarged Posterior Bilaterally] : posterior cervical [Cervical Lymph Nodes Enlarged Anterior Bilaterally] : anterior cervical [Supraclavicular Lymph Nodes Enlarged Bilaterally] : supraclavicular [Abnormal Walk] : normal gait [Nail Clubbing] : no clubbing  or cyanosis of the fingernails [Musculoskeletal - Swelling] : no joint swelling seen [Motor Tone] : muscle strength and tone were normal [FreeTextEntry1] : no SI ttt

## 2024-08-09 NOTE — PHYSICAL EXAM
[General Appearance - Alert] : alert [General Appearance - In No Acute Distress] : in no acute distress [No CVA Tenderness] : no ~M costovertebral angle tenderness [No Spinal Tenderness] : no spinal tenderness [Skin Turgor] : normal skin turgor [Skin Color & Pigmentation] : normal skin color and pigmentation [Oriented To Time, Place, And Person] : oriented to person, place, and time [Impaired Insight] : insight and judgment were intact [Affect] : the affect was normal [General Appearance - Well Nourished] : well nourished [General Appearance - Well Developed] : well developed [General Appearance - Well-Appearing] : healthy appearing [Sclera] : the sclera and conjunctiva were normal [Extraocular Movements] : extraocular movements were intact [Outer Ear] : the ears and nose were normal in appearance [] : the neck was supple [Respiration, Rhythm And Depth] : normal respiratory rhythm and effort [Exaggerated Use Of Accessory Muscles For Inspiration] : no accessory muscle use [Auscultation Breath Sounds / Voice Sounds] : lungs were clear to auscultation bilaterally [Heart Rate And Rhythm] : heart rate was normal and rhythm regular [Heart Sounds] : normal S1 and S2 [Heart Sounds Gallop] : no gallops [Murmurs] : no murmurs [Heart Sounds Pericardial Friction Rub] : no pericardial rub [Edema] : there was no peripheral edema [Cervical Lymph Nodes Enlarged Posterior Bilaterally] : posterior cervical [Cervical Lymph Nodes Enlarged Anterior Bilaterally] : anterior cervical [Supraclavicular Lymph Nodes Enlarged Bilaterally] : supraclavicular [Abnormal Walk] : normal gait [Nail Clubbing] : no clubbing  or cyanosis of the fingernails [Musculoskeletal - Swelling] : no joint swelling seen [Motor Tone] : muscle strength and tone were normal [FreeTextEntry1] : no SI ttt

## 2024-08-09 NOTE — ASSESSMENT
[FreeTextEntry1] : Ms. Garcia is a non-smoker who works for [employer] at home with a PMHx of RP (since childhood) and FHx of SLE here to r/o evolving aiCTD  # pregnancy - 3rd trimester  LMP 10.13.23 - due in October -- Pt has been compliant w/ f/u visit per trimester, labs have been stable reviewed 08/24 labs in full -- advise high risk with frequent early fetal echoes - as ab are positive and could cross placenta even if patient is clinically quiescent - no evidence complications related to lupus ab crossing placenta -- Next f/u will be 6-8 weeks postpartum  #positive LANCE long h/o RP and strong h/o aiCTD (mom has RA and SLE) -- no s/s of evolving aiCTD on exam today or by history  -- inflammatory markers - mildly elevated ESR - 08/24 ESR 30 -- serologies and sub-serologies positive for LANCE, chromatin and silica, RF.  does not meet classification criteria for aiCTD eg SLE at this time.  however, given extent of serologic positivity and RP would monitor closely for evolution  #RP likely primary -- check serologies/sub- serologies to r/o secondary causes - none by history -- goals of therapy are to improve qol and prevent tissue loss (ie, ulceration, gangrene).   -- potential triggers include cold, stress, smoke) as well as nonpharmacologic measures to help  -- avoidance of cold exposure as well as sudden temperature changes (circumstances that can trigger an attack include air conditioned environments, holding an ice-cold drink, working with cold foods such as making meatballs or washing salad, seasonal changes such as cool rainy days.  -- maintain whole body as well as digital warmth such as hand warmers, socks, layered clothing, hats, thermal underwear.  Can heat up clothing in a dryer and put them on while they are still warm before entering a cold environment. -- avoidance of vasoconstricting drugs when possible such as OTC nasal decongestants, diet pills, amphetamines, some headache meds - discussed sudafed use may worsen her RP - currently she says that it doesn't seem to be doing that but will be vigilant -- t/c pharmacologic therapy should these measures fail or sxs worsen.  Today's medical care services serve as the continuing focal point for needed health care services that are part of ongoing care related to a patient's single, serious condition or a complex condition.  More than 50% of the encounter was spent counseling the patient on differential, workup, disease course and treatment/management.  Education was provided to the patient during this encounter.  All questions and concerns were addressed and answered.   The patient verbalized understanding and agreed to the plan.   Patient has been instructed to call for an appointment if new symptoms develop. Patient has been instructed to make a follow-up appointment in 6-8weeks postpartum  Time spent on the encounter included, but is not limited to, preparing to see the patient, obtaining and/or reviewing separately obtained history, performing the evaluation, counseling and educating, independently interpreting results with communication to patient, order placement, referring and/or communicating with other health professionals as described, and documenting clinical information in the electronic health record.   This patient was evaluated by Dr. Link DNP in collaboration with Dr. Krista MD

## 2024-08-09 NOTE — ASSESSMENT
[FreeTextEntry1] : Ms. Garcia is a non-smoker who works for [employer] at home with a PMHx of RP (since childhood) and FHx of SLE here to r/o evolving aiCTD  # pregnancy - 3rd trimester  LMP 10.13.23 - due in October -- Pt has been compliant w/ f/u visit per trimester, labs have been stable reviewed 08/24 labs in full -- advise high risk with frequent early fetal echoes - as ab are positive and could cross placenta even if patient is clinically quiescent - no evidence complications related to lupus ab crossing placenta -- Next f/u will be 6-8 weeks postpartum  #positive LANCE long h/o RP and strong h/o aiCTD (mom has RA and SLE) -- no s/s of evolving aiCTD on exam today or by history  -- inflammatory markers - mildly elevated ESR - 08/24 ESR 30 -- serologies and sub-serologies positive for LANCE, chromatin and silica, RF.  does not meet classification criteria for aiCTD eg SLE at this time.  however, given extent of serologic positivity and RP would monitor closely for evolution  #RP likely primary -- check serologies/sub- serologies to r/o secondary causes - none by history -- goals of therapy are to improve qol and prevent tissue loss (ie, ulceration, gangrene).   -- potential triggers include cold, stress, smoke) as well as nonpharmacologic measures to help  -- avoidance of cold exposure as well as sudden temperature changes (circumstances that can trigger an attack include air conditioned environments, holding an ice-cold drink, working with cold foods such as making meatballs or washing salad, seasonal changes such as cool rainy days.  -- maintain whole body as well as digital warmth such as hand warmers, socks, layered clothing, hats, thermal underwear.  Can heat up clothing in a dryer and put them on while they are still warm before entering a cold environment. -- avoidance of vasoconstricting drugs when possible such as OTC nasal decongestants, diet pills, amphetamines, some headache meds - discussed sudafed use may worsen her RP - currently she says that it doesn't seem to be doing that but will be vigilant -- t/c pharmacologic therapy should these measures fail or sxs worsen.  Today's medical care services serve as the continuing focal point for needed health care services that are part of ongoing care related to a patient's single, serious condition or a complex condition.  More than 50% of the encounter was spent counseling the patient on differential, workup, disease course and treatment/management.  Education was provided to the patient during this encounter.  All questions and concerns were addressed and answered.   The patient verbalized understanding and agreed to the plan.   Patient has been instructed to call for an appointment if new symptoms develop. Patient has been instructed to make a follow-up appointment in 6-8weeks postpartum  Time spent on the encounter included, but is not limited to, preparing to see the patient, obtaining and/or reviewing separately obtained history, performing the evaluation, counseling and educating, independently interpreting results with communication to patient, order placement, referring and/or communicating with other health professionals as described, and documenting clinical information in the electronic health record.   This patient was evaluated by Dr. Link DNP in collaboration with Dr. Krista MD

## 2024-08-09 NOTE — HISTORY OF PRESENT ILLNESS
[___ Week(s) Ago] : [unfilled] week(s) ago [FreeTextEntry1] : Ms. Garcia is a non-smoker who works for [employer] at home with a PMHx of RP (since childhood) and FHx of SLE here to r/o evolving aiCTD  INTERVAL HX -October 13th due date, in 3rd trimester -Gestational diabetes dx but not on insulin - well controlled -Low back pain and stiffness but otherwise ok -Denies any joint pain or swelling - Improved iron level and ferritin on Fe supplement   ----------------- INTERVAL Hx pregnant - 10 weeks now  no rashes, joint pain  has some new left sided back pain - non-specific not reproducible and no sciatica symptoms  has some nausea  background RP - dx as a pre-teen.   Used to be quite severe, that numbness would develop in her fingers even with taking a shower.  Her rheum workup was negative at the time through pediatric rheumatology- though she doesn't remember which serologies were tested and which were positive.   However her mom developed SLE and her PCP subsequently did an LANCE which returned positive.  she is here for further evaluation  - when originally dx with RP, she was given a medication that made her feel warm - she doesn't remember what that pill was, though remembers it was an orange pill with green liquid.  at the time she was unable to swallow pills and so she opened the capsule and swallowed the liquid.  it made her feel warm and she did great on it.  she doesn't remember what kind of pill it was  Rheum ROS  - denies RP, sicca, oral ulcers, rashes, photosensitivity.    - denies skin tightening, ulcerations, nodules - denies constitutional symptoms, fatigue, night sweats.  weight is stable  - breathes well without h/o of pleuritis, pericarditis.  renal function is normal and urine is not frothy

## 2024-08-09 NOTE — ASSESSMENT
[FreeTextEntry1] : Ms. Garcia is a non-smoker who works for [employer] at home with a PMHx of RP (since childhood) and FHx of SLE here to r/o evolving aiCTD  # pregnancy - 3rd trimester  LMP 10.13.23 - due in October -- Pt has been compliant w/ f/u visit per trimester, labs have been stable reviewed 08/24 labs in full -- advise high risk with frequent early fetal echoes - as ab are positive and could cross placenta even if patient is clinically quiescent - no evidence complications related to lupus ab crossing placenta -- Next f/u will be 6-8 weeks postpartum  #positive LANCE long h/o RP and strong h/o aiCTD (mom has RA and SLE) -- no s/s of evolving aiCTD on exam today or by history  -- inflammatory markers - mildly elevated ESR - 08/24 ESR 30 -- serologies and sub-serologies positive for LANCE, chromatin and silica, RF.  does not meet classification criteria for aiCTD eg SLE at this time.  however, given extent of serologic positivity and RP would monitor closely for evolution  #RP likely primary -- check serologies/sub- serologies to r/o secondary causes - none by history -- goals of therapy are to improve qol and prevent tissue loss (ie, ulceration, gangrene).   -- potential triggers include cold, stress, smoke) as well as nonpharmacologic measures to help  -- avoidance of cold exposure as well as sudden temperature changes (circumstances that can trigger an attack include air conditioned environments, holding an ice-cold drink, working with cold foods such as making meatballs or washing salad, seasonal changes such as cool rainy days.  -- maintain whole body as well as digital warmth such as hand warmers, socks, layered clothing, hats, thermal underwear.  Can heat up clothing in a dryer and put them on while they are still warm before entering a cold environment. -- avoidance of vasoconstricting drugs when possible such as OTC nasal decongestants, diet pills, amphetamines, some headache meds - discussed sudafed use may worsen her RP - currently she says that it doesn't seem to be doing that but will be vigilant -- t/c pharmacologic therapy should these measures fail or sxs worsen.  Today's medical care services serve as the continuing focal point for needed health care services that are part of ongoing care related to a patient's single, serious condition or a complex condition.  More than 50% of the encounter was spent counseling the patient on differential, workup, disease course and treatment/management.  Education was provided to the patient during this encounter.  All questions and concerns were addressed and answered.   The patient verbalized understanding and agreed to the plan.   Patient has been instructed to call for an appointment if new symptoms develop. Patient has been instructed to make a follow-up appointment in 6-8weeks postpartum  Time spent on the encounter included, but is not limited to, preparing to see the patient, obtaining and/or reviewing separately obtained history, performing the evaluation, counseling and educating, independently interpreting results with communication to patient, order placement, referring and/or communicating with other health professionals as described, and documenting clinical information in the electronic health record.   This patient was evaluated by Dr. Link DNP in collaboration with Dr. Krista MD

## 2024-08-13 ENCOUNTER — NON-APPOINTMENT (OUTPATIENT)
Age: 34
End: 2024-08-13

## 2024-08-13 ENCOUNTER — APPOINTMENT (OUTPATIENT)
Dept: OBGYN | Facility: CLINIC | Age: 34
End: 2024-08-13
Payer: COMMERCIAL

## 2024-08-13 PROCEDURE — 0502F SUBSEQUENT PRENATAL CARE: CPT

## 2024-08-22 ENCOUNTER — TRANSCRIPTION ENCOUNTER (OUTPATIENT)
Age: 34
End: 2024-08-22

## 2024-08-26 ENCOUNTER — NON-APPOINTMENT (OUTPATIENT)
Age: 34
End: 2024-08-26

## 2024-08-26 ENCOUNTER — ASOB RESULT (OUTPATIENT)
Age: 34
End: 2024-08-26

## 2024-08-26 ENCOUNTER — APPOINTMENT (OUTPATIENT)
Dept: MATERNAL FETAL MEDICINE | Facility: CLINIC | Age: 34
End: 2024-08-26
Payer: COMMERCIAL

## 2024-08-26 PROCEDURE — G0108 DIAB MANAGE TRN  PER INDIV: CPT | Mod: 95

## 2024-08-27 ENCOUNTER — APPOINTMENT (OUTPATIENT)
Dept: OBGYN | Facility: CLINIC | Age: 34
End: 2024-08-27
Payer: COMMERCIAL

## 2024-08-27 DIAGNOSIS — O09.93 SUPERVISION OF HIGH RISK PREGNANCY, UNSPECIFIED, THIRD TRIMESTER: ICD-10-CM

## 2024-08-27 PROCEDURE — 76819 FETAL BIOPHYS PROFIL W/O NST: CPT | Mod: 59

## 2024-08-27 PROCEDURE — 76816 OB US FOLLOW-UP PER FETUS: CPT

## 2024-08-27 PROCEDURE — 0502F SUBSEQUENT PRENATAL CARE: CPT

## 2024-08-28 ENCOUNTER — NON-APPOINTMENT (OUTPATIENT)
Age: 34
End: 2024-08-28

## 2024-08-30 ENCOUNTER — NON-APPOINTMENT (OUTPATIENT)
Age: 34
End: 2024-08-30

## 2024-09-09 ENCOUNTER — ASOB RESULT (OUTPATIENT)
Age: 34
End: 2024-09-09

## 2024-09-09 ENCOUNTER — APPOINTMENT (OUTPATIENT)
Dept: MATERNAL FETAL MEDICINE | Facility: CLINIC | Age: 34
End: 2024-09-09
Payer: COMMERCIAL

## 2024-09-09 PROCEDURE — G0108 DIAB MANAGE TRN  PER INDIV: CPT | Mod: 95

## 2024-09-10 ENCOUNTER — NON-APPOINTMENT (OUTPATIENT)
Age: 34
End: 2024-09-10

## 2024-09-10 ENCOUNTER — APPOINTMENT (OUTPATIENT)
Dept: OBGYN | Facility: CLINIC | Age: 34
End: 2024-09-10

## 2024-09-10 PROCEDURE — 0502F SUBSEQUENT PRENATAL CARE: CPT

## 2024-09-11 ENCOUNTER — OUTPATIENT (OUTPATIENT)
Dept: OUTPATIENT SERVICES | Facility: HOSPITAL | Age: 34
LOS: 1 days | Discharge: HOME | End: 2024-09-11

## 2024-09-11 DIAGNOSIS — D64.9 ANEMIA, UNSPECIFIED: ICD-10-CM

## 2024-09-13 ENCOUNTER — TRANSCRIPTION ENCOUNTER (OUTPATIENT)
Age: 34
End: 2024-09-13

## 2024-09-17 ENCOUNTER — NON-APPOINTMENT (OUTPATIENT)
Age: 34
End: 2024-09-17

## 2024-09-18 ENCOUNTER — NON-APPOINTMENT (OUTPATIENT)
Age: 34
End: 2024-09-18

## 2024-09-18 ENCOUNTER — APPOINTMENT (OUTPATIENT)
Dept: HEMATOLOGY ONCOLOGY | Facility: CLINIC | Age: 34
End: 2024-09-18

## 2024-09-18 ENCOUNTER — APPOINTMENT (OUTPATIENT)
Dept: OBGYN | Facility: CLINIC | Age: 34
End: 2024-09-18
Payer: COMMERCIAL

## 2024-09-18 ENCOUNTER — RESULT REVIEW (OUTPATIENT)
Age: 34
End: 2024-09-18

## 2024-09-18 ENCOUNTER — MED ADMIN CHARGE (OUTPATIENT)
Age: 34
End: 2024-09-18

## 2024-09-18 VITALS
RESPIRATION RATE: 18 BRPM | WEIGHT: 172.62 LBS | HEART RATE: 74 BPM | TEMPERATURE: 97 F | OXYGEN SATURATION: 98 % | SYSTOLIC BLOOD PRESSURE: 109 MMHG | BODY MASS INDEX: 33.71 KG/M2 | DIASTOLIC BLOOD PRESSURE: 74 MMHG

## 2024-09-18 DIAGNOSIS — Z29.11 ENCOUNTER FOR PROPHYLACTIC IMMUNOTHERAPY FOR RESPIRATORY SYNCYTIAL VIRUS (RSV): ICD-10-CM

## 2024-09-18 DIAGNOSIS — Z36.85 ENCOUNTER FOR ANTENATAL SCREENING FOR STREPTOCOCCUS B: ICD-10-CM

## 2024-09-18 DIAGNOSIS — O09.93 SUPERVISION OF HIGH RISK PREGNANCY, UNSPECIFIED, THIRD TRIMESTER: ICD-10-CM

## 2024-09-18 DIAGNOSIS — Z23 ENCOUNTER FOR IMMUNIZATION: ICD-10-CM

## 2024-09-18 LAB
BASOPHILS # BLD AUTO: 0.03 K/UL — SIGNIFICANT CHANGE UP (ref 0–0.2)
BASOPHILS NFR BLD AUTO: 0.4 % — SIGNIFICANT CHANGE UP (ref 0–2)
EOSINOPHIL # BLD AUTO: 0.06 K/UL — SIGNIFICANT CHANGE UP (ref 0–0.5)
EOSINOPHIL NFR BLD AUTO: 0.7 % — SIGNIFICANT CHANGE UP (ref 0–6)
HCT VFR BLD CALC: 37.6 % — SIGNIFICANT CHANGE UP (ref 34.5–45)
HGB BLD-MCNC: 12.8 G/DL — SIGNIFICANT CHANGE UP (ref 11.5–15.5)
IMM GRANULOCYTES NFR BLD AUTO: 0.6 % — SIGNIFICANT CHANGE UP (ref 0–0.9)
LYMPHOCYTES # BLD AUTO: 1.7 K/UL — SIGNIFICANT CHANGE UP (ref 1–3.3)
LYMPHOCYTES # BLD AUTO: 21.1 % — SIGNIFICANT CHANGE UP (ref 13–44)
MCHC RBC-ENTMCNC: 31.8 PG — SIGNIFICANT CHANGE UP (ref 27–34)
MCHC RBC-ENTMCNC: 34 G/DL — SIGNIFICANT CHANGE UP (ref 32–36)
MCV RBC AUTO: 93.3 FL — SIGNIFICANT CHANGE UP (ref 80–100)
MONOCYTES # BLD AUTO: 0.61 K/UL — SIGNIFICANT CHANGE UP (ref 0–0.9)
MONOCYTES NFR BLD AUTO: 7.6 % — SIGNIFICANT CHANGE UP (ref 2–14)
NEUTROPHILS # BLD AUTO: 5.62 K/UL — SIGNIFICANT CHANGE UP (ref 1.8–7.4)
NEUTROPHILS NFR BLD AUTO: 69.6 % — SIGNIFICANT CHANGE UP (ref 43–77)
NRBC # BLD: 0 /100 WBCS — SIGNIFICANT CHANGE UP (ref 0–0)
NRBC BLD-RTO: 0 /100 WBCS — SIGNIFICANT CHANGE UP (ref 0–0)
PLATELET # BLD AUTO: 179 K/UL — SIGNIFICANT CHANGE UP (ref 150–400)
RBC # BLD: 4.03 M/UL — SIGNIFICANT CHANGE UP (ref 3.8–5.2)
RBC # FLD: 13.6 % — SIGNIFICANT CHANGE UP (ref 10.3–14.5)
RETICS #: 94.7 K/UL — SIGNIFICANT CHANGE UP (ref 25–125)
RETICS/RBC NFR: 2.4 % — SIGNIFICANT CHANGE UP (ref 0.5–2.5)
WBC # BLD: 8.07 K/UL — SIGNIFICANT CHANGE UP (ref 3.8–10.5)
WBC # FLD AUTO: 8.07 K/UL — SIGNIFICANT CHANGE UP (ref 3.8–10.5)

## 2024-09-18 PROCEDURE — 99214 OFFICE O/P EST MOD 30 MIN: CPT

## 2024-09-18 PROCEDURE — 0502F SUBSEQUENT PRENATAL CARE: CPT

## 2024-09-19 LAB
GLUCOSE QUALITATIVE U: NEGATIVE
PROTEIN URINE: NEGATIVE

## 2024-09-20 ENCOUNTER — NON-APPOINTMENT (OUTPATIENT)
Age: 34
End: 2024-09-20

## 2024-09-20 LAB
GP B STREP DNA SPEC QL NAA+PROBE: NOT DETECTED
SOURCE GBS: NORMAL

## 2024-09-24 ENCOUNTER — APPOINTMENT (OUTPATIENT)
Dept: MATERNAL FETAL MEDICINE | Facility: CLINIC | Age: 34
End: 2024-09-24
Payer: COMMERCIAL

## 2024-09-24 ENCOUNTER — ASOB RESULT (OUTPATIENT)
Age: 34
End: 2024-09-24

## 2024-09-24 PROCEDURE — G0108 DIAB MANAGE TRN  PER INDIV: CPT | Mod: 95

## 2024-09-27 ENCOUNTER — NON-APPOINTMENT (OUTPATIENT)
Age: 34
End: 2024-09-27

## 2024-09-27 ENCOUNTER — ASOB RESULT (OUTPATIENT)
Age: 34
End: 2024-09-27

## 2024-09-27 ENCOUNTER — APPOINTMENT (OUTPATIENT)
Age: 34
End: 2024-09-27
Payer: COMMERCIAL

## 2024-09-27 ENCOUNTER — APPOINTMENT (OUTPATIENT)
Dept: OBGYN | Facility: CLINIC | Age: 34
End: 2024-09-27
Payer: COMMERCIAL

## 2024-09-27 DIAGNOSIS — Z34.93 ENCOUNTER FOR SUPERVISION OF NORMAL PREGNANCY, UNSPECIFIED, THIRD TRIMESTER: ICD-10-CM

## 2024-09-27 LAB
GLUCOSE QUALITATIVE U: NEGATIVE
PROTEIN URINE: NEGATIVE

## 2024-09-27 PROCEDURE — 0502F SUBSEQUENT PRENATAL CARE: CPT

## 2024-09-27 PROCEDURE — 76819 FETAL BIOPHYS PROFIL W/O NST: CPT | Mod: 59

## 2024-09-27 PROCEDURE — S9443: CPT | Mod: 95

## 2024-09-27 PROCEDURE — 76816 OB US FOLLOW-UP PER FETUS: CPT

## 2024-09-30 ENCOUNTER — TRANSCRIPTION ENCOUNTER (OUTPATIENT)
Age: 34
End: 2024-09-30

## 2024-09-30 ENCOUNTER — NON-APPOINTMENT (OUTPATIENT)
Age: 34
End: 2024-09-30

## 2024-10-02 ENCOUNTER — APPOINTMENT (OUTPATIENT)
Dept: OBGYN | Facility: CLINIC | Age: 34
End: 2024-10-02
Payer: COMMERCIAL

## 2024-10-02 PROCEDURE — 0502F SUBSEQUENT PRENATAL CARE: CPT

## 2024-10-10 ENCOUNTER — APPOINTMENT (OUTPATIENT)
Dept: OBGYN | Facility: CLINIC | Age: 34
End: 2024-10-10
Payer: COMMERCIAL

## 2024-10-10 ENCOUNTER — NON-APPOINTMENT (OUTPATIENT)
Age: 34
End: 2024-10-10

## 2024-10-10 ENCOUNTER — MED ADMIN CHARGE (OUTPATIENT)
Age: 34
End: 2024-10-10

## 2024-10-10 DIAGNOSIS — O24.419 GESTATIONAL DIABETES MELLITUS IN PREGNANCY, UNSPECIFIED CONTROL: ICD-10-CM

## 2024-10-10 DIAGNOSIS — Z23 ENCOUNTER FOR IMMUNIZATION: ICD-10-CM

## 2024-10-10 PROCEDURE — 90471 IMMUNIZATION ADMIN: CPT

## 2024-10-10 PROCEDURE — 0502F SUBSEQUENT PRENATAL CARE: CPT

## 2024-10-10 PROCEDURE — 90656 IIV3 VACC NO PRSV 0.5 ML IM: CPT

## 2024-10-11 ENCOUNTER — NON-APPOINTMENT (OUTPATIENT)
Age: 34
End: 2024-10-11

## 2024-10-11 ENCOUNTER — INPATIENT (INPATIENT)
Facility: HOSPITAL | Age: 34
LOS: 0 days | Discharge: HOME | End: 2024-10-12
Attending: OBSTETRICS & GYNECOLOGY | Admitting: OBSTETRICS & GYNECOLOGY
Payer: COMMERCIAL

## 2024-10-11 ENCOUNTER — APPOINTMENT (OUTPATIENT)
Dept: OBGYN | Facility: CLINIC | Age: 34
End: 2024-10-11

## 2024-10-11 VITALS — SYSTOLIC BLOOD PRESSURE: 133 MMHG | HEART RATE: 90 BPM | DIASTOLIC BLOOD PRESSURE: 81 MMHG | TEMPERATURE: 98 F

## 2024-10-11 DIAGNOSIS — O26.899 OTHER SPECIFIED PREGNANCY RELATED CONDITIONS, UNSPECIFIED TRIMESTER: ICD-10-CM

## 2024-10-11 DIAGNOSIS — Z34.80 ENCOUNTER FOR SUPERVISION OF OTHER NORMAL PREGNANCY, UNSPECIFIED TRIMESTER: ICD-10-CM

## 2024-10-11 LAB
BASOPHILS # BLD AUTO: 0.04 K/UL — SIGNIFICANT CHANGE UP (ref 0–0.2)
BASOPHILS NFR BLD AUTO: 0.3 % — SIGNIFICANT CHANGE UP (ref 0–2)
EOSINOPHIL # BLD AUTO: 0.04 K/UL — SIGNIFICANT CHANGE UP (ref 0–0.5)
EOSINOPHIL NFR BLD AUTO: 0.3 % — SIGNIFICANT CHANGE UP (ref 0–6)
HCT VFR BLD CALC: 43.4 % — SIGNIFICANT CHANGE UP (ref 34.5–45)
HGB BLD-MCNC: 14.4 G/DL — SIGNIFICANT CHANGE UP (ref 11.5–15.5)
IMM GRANULOCYTES NFR BLD AUTO: 0.4 % — SIGNIFICANT CHANGE UP (ref 0–0.9)
LYMPHOCYTES # BLD AUTO: 1.68 K/UL — SIGNIFICANT CHANGE UP (ref 1–3.3)
LYMPHOCYTES # BLD AUTO: 14.4 % — SIGNIFICANT CHANGE UP (ref 13–44)
MCHC RBC-ENTMCNC: 31 PG — SIGNIFICANT CHANGE UP (ref 27–34)
MCHC RBC-ENTMCNC: 33.2 GM/DL — SIGNIFICANT CHANGE UP (ref 32–36)
MCV RBC AUTO: 93.5 FL — SIGNIFICANT CHANGE UP (ref 80–100)
MONOCYTES # BLD AUTO: 0.79 K/UL — SIGNIFICANT CHANGE UP (ref 0–0.9)
MONOCYTES NFR BLD AUTO: 6.8 % — SIGNIFICANT CHANGE UP (ref 2–14)
NEUTROPHILS # BLD AUTO: 9.07 K/UL — HIGH (ref 1.8–7.4)
NEUTROPHILS NFR BLD AUTO: 77.8 % — HIGH (ref 43–77)
NRBC # BLD: 0 /100 WBCS — SIGNIFICANT CHANGE UP (ref 0–0)
NRBC BLD-RTO: 0 /100 WBCS — SIGNIFICANT CHANGE UP (ref 0–0)
PLATELET # BLD AUTO: 178 K/UL — SIGNIFICANT CHANGE UP (ref 150–400)
RBC # BLD: 4.64 M/UL — SIGNIFICANT CHANGE UP (ref 3.8–5.2)
RBC # FLD: 13.5 % — SIGNIFICANT CHANGE UP (ref 10.3–14.5)
T PALLIDUM AB TITR SER: NEGATIVE — SIGNIFICANT CHANGE UP
WBC # BLD: 11.67 K/UL — HIGH (ref 3.8–10.5)
WBC # FLD AUTO: 11.67 K/UL — HIGH (ref 3.8–10.5)

## 2024-10-11 PROCEDURE — 59400 OBSTETRICAL CARE: CPT

## 2024-10-11 RX ORDER — HYDROCORTISONE 10 MG/G
1 CREAM TOPICAL EVERY 6 HOURS
Refills: 0 | Status: DISCONTINUED | OUTPATIENT
Start: 2024-10-11 | End: 2024-10-12

## 2024-10-11 RX ORDER — SODIUM CHLORIDE 9 G/1000ML
1000 INJECTION, SOLUTION INTRAVENOUS
Refills: 0 | Status: DISCONTINUED | OUTPATIENT
Start: 2024-10-11 | End: 2024-10-11

## 2024-10-11 RX ORDER — PRAMOXINE HCL 1 %
1 GEL (GRAM) TOPICAL EVERY 4 HOURS
Refills: 0 | Status: DISCONTINUED | OUTPATIENT
Start: 2024-10-11 | End: 2024-10-12

## 2024-10-11 RX ORDER — OXYCODONE HYDROCHLORIDE 30 MG/1
5 TABLET ORAL ONCE
Refills: 0 | Status: DISCONTINUED | OUTPATIENT
Start: 2024-10-11 | End: 2024-10-12

## 2024-10-11 RX ORDER — MODIFIED LANOLIN 100 %
1 CREAM (GRAM) TOPICAL EVERY 6 HOURS
Refills: 0 | Status: DISCONTINUED | OUTPATIENT
Start: 2024-10-11 | End: 2024-10-12

## 2024-10-11 RX ORDER — DIBUCAINE 10 MG/G
1 OINTMENT TOPICAL EVERY 6 HOURS
Refills: 0 | Status: DISCONTINUED | OUTPATIENT
Start: 2024-10-11 | End: 2024-10-12

## 2024-10-11 RX ORDER — CEFAZOLIN SODIUM IN 0.9 % NACL 3 G/100 ML
2000 INTRAVENOUS SOLUTION, PIGGYBACK (ML) INTRAVENOUS ONCE
Refills: 0 | Status: COMPLETED | OUTPATIENT
Start: 2024-10-11 | End: 2024-10-11

## 2024-10-11 RX ORDER — DIPHENHYDRAMINE HCL 12.5MG/5ML
25 ELIXIR ORAL EVERY 6 HOURS
Refills: 0 | Status: DISCONTINUED | OUTPATIENT
Start: 2024-10-11 | End: 2024-10-12

## 2024-10-11 RX ORDER — OXYTOCIN-SODIUM CHLORIDE 0.9% IV SOLN 30 UNIT/500ML 30-0.9/5 UT/ML-%
167 SOLUTION INTRAVENOUS
Qty: 30 | Refills: 0 | Status: DISCONTINUED | OUTPATIENT
Start: 2024-10-11 | End: 2024-10-12

## 2024-10-11 RX ORDER — PRENATAL 136/IRON/FOLIC ACID 27 MG-1 MG
1 TABLET ORAL DAILY
Refills: 0 | Status: DISCONTINUED | OUTPATIENT
Start: 2024-10-11 | End: 2024-10-12

## 2024-10-11 RX ORDER — IBUPROFEN 200 MG
600 TABLET ORAL EVERY 6 HOURS
Refills: 0 | Status: DISCONTINUED | OUTPATIENT
Start: 2024-10-11 | End: 2024-10-12

## 2024-10-11 RX ORDER — SIMETHICONE 80 MG
80 TABLET,CHEWABLE ORAL EVERY 4 HOURS
Refills: 0 | Status: DISCONTINUED | OUTPATIENT
Start: 2024-10-11 | End: 2024-10-12

## 2024-10-11 RX ORDER — MAGNESIUM HYDROXIDE 400 MG/5ML
30 SUSPENSION ORAL
Refills: 0 | Status: DISCONTINUED | OUTPATIENT
Start: 2024-10-11 | End: 2024-10-12

## 2024-10-11 RX ORDER — IBUPROFEN 200 MG
600 TABLET ORAL EVERY 6 HOURS
Refills: 0 | Status: COMPLETED | OUTPATIENT
Start: 2024-10-11 | End: 2025-09-09

## 2024-10-11 RX ORDER — WITCH HAZEL LEAF
1 FLUID EXTRACT MISCELLANEOUS EVERY 4 HOURS
Refills: 0 | Status: DISCONTINUED | OUTPATIENT
Start: 2024-10-11 | End: 2024-10-12

## 2024-10-11 RX ORDER — OXYCODONE HYDROCHLORIDE 30 MG/1
5 TABLET ORAL
Refills: 0 | Status: DISCONTINUED | OUTPATIENT
Start: 2024-10-11 | End: 2024-10-12

## 2024-10-11 RX ORDER — KETOROLAC TROMETHAMINE 30 MG/ML
30 INJECTION, SOLUTION INTRAMUSCULAR; INTRAVENOUS ONCE
Refills: 0 | Status: DISCONTINUED | OUTPATIENT
Start: 2024-10-11 | End: 2024-10-11

## 2024-10-11 RX ORDER — ACETAMINOPHEN 500 MG/5ML
975 LIQUID (ML) ORAL
Refills: 0 | Status: DISCONTINUED | OUTPATIENT
Start: 2024-10-11 | End: 2024-10-12

## 2024-10-11 RX ORDER — CITRIC ACID/SODIUM CITRATE 300-500 MG
15 SOLUTION, ORAL ORAL EVERY 6 HOURS
Refills: 0 | Status: DISCONTINUED | OUTPATIENT
Start: 2024-10-11 | End: 2024-10-11

## 2024-10-11 RX ORDER — BENZOCAINE 220 MG/G
1 SPRAY, METERED PERIODONTAL EVERY 6 HOURS
Refills: 0 | Status: DISCONTINUED | OUTPATIENT
Start: 2024-10-11 | End: 2024-10-12

## 2024-10-11 RX ADMIN — KETOROLAC TROMETHAMINE 30 MILLIGRAM(S): 30 INJECTION, SOLUTION INTRAMUSCULAR; INTRAVENOUS at 10:53

## 2024-10-11 RX ADMIN — Medication 600 MILLIGRAM(S): at 17:52

## 2024-10-11 RX ADMIN — Medication 100 MILLIGRAM(S): at 13:25

## 2024-10-12 ENCOUNTER — TRANSCRIPTION ENCOUNTER (OUTPATIENT)
Age: 34
End: 2024-10-12

## 2024-10-12 VITALS
DIASTOLIC BLOOD PRESSURE: 65 MMHG | SYSTOLIC BLOOD PRESSURE: 96 MMHG | OXYGEN SATURATION: 97 % | HEART RATE: 69 BPM | RESPIRATION RATE: 18 BRPM | TEMPERATURE: 98 F

## 2024-10-12 PROCEDURE — 86850 RBC ANTIBODY SCREEN: CPT

## 2024-10-12 PROCEDURE — 86780 TREPONEMA PALLIDUM: CPT

## 2024-10-12 PROCEDURE — 86900 BLOOD TYPING SEROLOGIC ABO: CPT

## 2024-10-12 PROCEDURE — 85025 COMPLETE CBC W/AUTO DIFF WBC: CPT

## 2024-10-12 PROCEDURE — 59050 FETAL MONITOR W/REPORT: CPT

## 2024-10-12 PROCEDURE — 86901 BLOOD TYPING SEROLOGIC RH(D): CPT

## 2024-10-12 RX ORDER — PRENATAL 136/IRON/FOLIC ACID 27 MG-1 MG
1 TABLET ORAL
Qty: 0 | Refills: 0 | DISCHARGE
Start: 2024-10-12

## 2024-10-12 RX ADMIN — Medication 600 MILLIGRAM(S): at 05:34

## 2024-10-12 RX ADMIN — Medication 1 TABLET(S): at 11:56

## 2024-10-12 RX ADMIN — Medication 600 MILLIGRAM(S): at 11:56

## 2024-10-12 RX ADMIN — Medication 600 MILLIGRAM(S): at 12:45

## 2024-10-12 RX ADMIN — Medication 600 MILLIGRAM(S): at 18:20

## 2024-10-12 RX ADMIN — Medication 600 MILLIGRAM(S): at 17:26

## 2024-10-12 RX ADMIN — Medication 600 MILLIGRAM(S): at 06:13

## 2024-10-14 ENCOUNTER — NON-APPOINTMENT (OUTPATIENT)
Age: 34
End: 2024-10-14

## 2024-10-15 ENCOUNTER — APPOINTMENT (OUTPATIENT)
Dept: OBGYN | Facility: CLINIC | Age: 34
End: 2024-10-15

## 2024-10-17 ENCOUNTER — RX RENEWAL (OUTPATIENT)
Age: 34
End: 2024-10-17

## 2024-10-28 NOTE — OB PROVIDER DELIVERY SUMMARY - NSPROVIDERDELIVERYNOTE_OBGYN_ALL_OB_FT
MOLECULAR PCR
Patient fully dilated and pushing,  of viable male infant in OA positon, head body shoulders delivered without difficultly. Loose nuchal delivered through. Spontaneous removal of placenta. Fundal massage and pitocin given. 1st degree lac noted and repaired in standard fashion. Infant and mother in stable condition

## 2024-11-19 ENCOUNTER — NON-APPOINTMENT (OUTPATIENT)
Age: 34
End: 2024-11-19

## 2024-11-20 ENCOUNTER — APPOINTMENT (OUTPATIENT)
Dept: OBGYN | Facility: CLINIC | Age: 34
End: 2024-11-20
Payer: COMMERCIAL

## 2024-11-20 VITALS
WEIGHT: 155 LBS | SYSTOLIC BLOOD PRESSURE: 115 MMHG | BODY MASS INDEX: 29.27 KG/M2 | HEIGHT: 61 IN | DIASTOLIC BLOOD PRESSURE: 76 MMHG

## 2024-11-20 DIAGNOSIS — Z86.32 PERSONAL HISTORY OF GESTATIONAL DIABETES: ICD-10-CM

## 2024-11-20 DIAGNOSIS — Z30.9 ENCOUNTER FOR CONTRACEPTIVE MANAGEMENT, UNSPECIFIED: ICD-10-CM

## 2024-11-20 PROCEDURE — 0503F POSTPARTUM CARE VISIT: CPT

## 2024-11-20 RX ORDER — NORETHINDRONE 0.35 MG/1
0.35 TABLET ORAL DAILY
Qty: 3 | Refills: 3 | Status: ACTIVE | COMMUNITY
Start: 2024-11-20 | End: 1900-01-01

## 2024-12-03 ENCOUNTER — LABORATORY RESULT (OUTPATIENT)
Age: 34
End: 2024-12-03

## 2024-12-10 ENCOUNTER — TRANSCRIPTION ENCOUNTER (OUTPATIENT)
Age: 34
End: 2024-12-10

## 2024-12-19 ENCOUNTER — APPOINTMENT (OUTPATIENT)
Dept: RHEUMATOLOGY | Facility: CLINIC | Age: 34
End: 2024-12-19
Payer: COMMERCIAL

## 2024-12-19 DIAGNOSIS — Z36.85 ENCOUNTER FOR ANTENATAL SCREENING FOR STREPTOCOCCUS B: ICD-10-CM

## 2024-12-19 DIAGNOSIS — Z30.9 ENCOUNTER FOR CONTRACEPTIVE MANAGEMENT, UNSPECIFIED: ICD-10-CM

## 2024-12-19 DIAGNOSIS — Z34.93 ENCOUNTER FOR SUPERVISION OF NORMAL PREGNANCY, UNSPECIFIED, THIRD TRIMESTER: ICD-10-CM

## 2024-12-19 DIAGNOSIS — O09.93 SUPERVISION OF HIGH RISK PREGNANCY, UNSPECIFIED, THIRD TRIMESTER: ICD-10-CM

## 2024-12-19 DIAGNOSIS — O09.90 SUPERVISION OF HIGH RISK PREGNANCY, UNSPECIFIED, UNSPECIFIED TRIMESTER: ICD-10-CM

## 2024-12-19 DIAGNOSIS — Z71.9 COUNSELING, UNSPECIFIED: ICD-10-CM

## 2024-12-19 DIAGNOSIS — R76.8 OTHER SPECIFIED ABNORMAL IMMUNOLOGICAL FINDINGS IN SERUM: ICD-10-CM

## 2024-12-19 DIAGNOSIS — R76.0 RAISED ANTIBODY TITER: ICD-10-CM

## 2024-12-19 DIAGNOSIS — R70.0 ELEVATED ERYTHROCYTE SEDIMENTATION RATE: ICD-10-CM

## 2024-12-19 DIAGNOSIS — Z01.419 ENCOUNTER FOR GYNECOLOGICAL EXAMINATION (GENERAL) (ROUTINE) W/OUT ABNORMAL FINDINGS: ICD-10-CM

## 2024-12-19 DIAGNOSIS — O99.512 DISEASES OF THE RESPIRATORY SYSTEM COMPLICATING PREGNANCY, SECOND TRIMESTER: ICD-10-CM

## 2024-12-19 DIAGNOSIS — O99.019 ANEMIA COMPLICATING PREGNANCY, UNSPECIFIED TRIMESTER: ICD-10-CM

## 2024-12-19 DIAGNOSIS — Z36.9 ENCOUNTER FOR ANTENATAL SCREENING, UNSPECIFIED: ICD-10-CM

## 2024-12-19 DIAGNOSIS — J45.909 DISEASES OF THE RESPIRATORY SYSTEM COMPLICATING PREGNANCY, SECOND TRIMESTER: ICD-10-CM

## 2024-12-19 DIAGNOSIS — I73.00 RAYNAUD'S SYNDROME W/OUT GANGRENE: ICD-10-CM

## 2024-12-19 PROCEDURE — G2211 COMPLEX E/M VISIT ADD ON: CPT

## 2024-12-19 PROCEDURE — 99213 OFFICE O/P EST LOW 20 MIN: CPT

## 2025-01-16 ENCOUNTER — APPOINTMENT (OUTPATIENT)
Dept: OBGYN | Facility: CLINIC | Age: 35
End: 2025-01-16
Payer: COMMERCIAL

## 2025-01-16 VITALS
DIASTOLIC BLOOD PRESSURE: 73 MMHG | SYSTOLIC BLOOD PRESSURE: 108 MMHG | BODY MASS INDEX: 27.6 KG/M2 | WEIGHT: 150 LBS | HEIGHT: 62 IN

## 2025-01-16 DIAGNOSIS — Z01.419 ENCOUNTER FOR GYNECOLOGICAL EXAMINATION (GENERAL) (ROUTINE) W/OUT ABNORMAL FINDINGS: ICD-10-CM

## 2025-01-16 DIAGNOSIS — Z11.51 ENCOUNTER FOR SCREENING FOR HUMAN PAPILLOMAVIRUS (HPV): ICD-10-CM

## 2025-01-16 PROCEDURE — 99459 PELVIC EXAMINATION: CPT

## 2025-01-16 PROCEDURE — 99395 PREV VISIT EST AGE 18-39: CPT

## 2025-01-17 ENCOUNTER — TRANSCRIPTION ENCOUNTER (OUTPATIENT)
Age: 35
End: 2025-01-17

## 2025-01-17 LAB — HPV HIGH+LOW RISK DNA PNL CVX: NOT DETECTED

## 2025-01-22 LAB — CYTOLOGY CVX/VAG DOC THIN PREP: NORMAL

## 2025-02-07 ENCOUNTER — TRANSCRIPTION ENCOUNTER (OUTPATIENT)
Age: 35
End: 2025-02-07

## 2025-02-10 ENCOUNTER — APPOINTMENT (OUTPATIENT)
Dept: INTERNAL MEDICINE | Facility: CLINIC | Age: 35
End: 2025-02-10
Payer: COMMERCIAL

## 2025-02-10 VITALS
OXYGEN SATURATION: 99 % | DIASTOLIC BLOOD PRESSURE: 64 MMHG | SYSTOLIC BLOOD PRESSURE: 102 MMHG | WEIGHT: 156 LBS | HEART RATE: 84 BPM | TEMPERATURE: 97.8 F | HEIGHT: 62 IN | BODY MASS INDEX: 28.71 KG/M2 | RESPIRATION RATE: 18 BRPM

## 2025-02-10 DIAGNOSIS — Z86.32 PERSONAL HISTORY OF GESTATIONAL DIABETES: ICD-10-CM

## 2025-02-10 DIAGNOSIS — Z00.00 ENCOUNTER FOR GENERAL ADULT MEDICAL EXAMINATION W/OUT ABNORMAL FINDINGS: ICD-10-CM

## 2025-02-10 PROCEDURE — 99385 PREV VISIT NEW AGE 18-39: CPT

## 2025-02-11 LAB
ALBUMIN SERPL ELPH-MCNC: 4.6 G/DL
ALP BLD-CCNC: 82 U/L
ALT SERPL-CCNC: 16 U/L
ANION GAP SERPL CALC-SCNC: 12 MMOL/L
AST SERPL-CCNC: 20 U/L
BASOPHILS # BLD AUTO: 0.05 K/UL
BASOPHILS NFR BLD AUTO: 0.7 %
BILIRUB SERPL-MCNC: 0.4 MG/DL
BUN SERPL-MCNC: 14 MG/DL
CALCIUM SERPL-MCNC: 10.4 MG/DL
CHLORIDE SERPL-SCNC: 103 MMOL/L
CHOLEST SERPL-MCNC: 191 MG/DL
CO2 SERPL-SCNC: 24 MMOL/L
CREAT SERPL-MCNC: 0.65 MG/DL
EGFR: 119 ML/MIN/1.73M2
EOSINOPHIL # BLD AUTO: 0.32 K/UL
EOSINOPHIL NFR BLD AUTO: 4.6 %
ESTIMATED AVERAGE GLUCOSE: 100 MG/DL
GLUCOSE SERPL-MCNC: 74 MG/DL
HBA1C MFR BLD HPLC: 5.1 %
HCT VFR BLD CALC: 42.5 %
HDLC SERPL-MCNC: 74 MG/DL
HGB BLD-MCNC: 13.8 G/DL
IMM GRANULOCYTES NFR BLD AUTO: 0.3 %
LDLC SERPL CALC-MCNC: 109 MG/DL
LYMPHOCYTES # BLD AUTO: 2.02 K/UL
LYMPHOCYTES NFR BLD AUTO: 28.9 %
MAN DIFF?: NORMAL
MCHC RBC-ENTMCNC: 30.9 PG
MCHC RBC-ENTMCNC: 32.5 G/DL
MCV RBC AUTO: 95.1 FL
MONOCYTES # BLD AUTO: 0.49 K/UL
MONOCYTES NFR BLD AUTO: 7 %
NEUTROPHILS # BLD AUTO: 4.09 K/UL
NEUTROPHILS NFR BLD AUTO: 58.5 %
NONHDLC SERPL-MCNC: 116 MG/DL
PLATELET # BLD AUTO: 287 K/UL
POTASSIUM SERPL-SCNC: 5 MMOL/L
PROT SERPL-MCNC: 7.5 G/DL
RBC # BLD: 4.47 M/UL
RBC # FLD: 12.3 %
SODIUM SERPL-SCNC: 138 MMOL/L
TRIGL SERPL-MCNC: 37 MG/DL
TSH SERPL-ACNC: 0.64 UIU/ML
WBC # FLD AUTO: 6.99 K/UL

## 2025-03-14 ENCOUNTER — NON-APPOINTMENT (OUTPATIENT)
Age: 35
End: 2025-03-14

## 2025-04-21 ENCOUNTER — NON-APPOINTMENT (OUTPATIENT)
Age: 35
End: 2025-04-21

## 2025-04-22 ENCOUNTER — TRANSCRIPTION ENCOUNTER (OUTPATIENT)
Age: 35
End: 2025-04-22

## 2025-05-29 ENCOUNTER — LABORATORY RESULT (OUTPATIENT)
Age: 35
End: 2025-05-29

## 2025-06-05 ENCOUNTER — APPOINTMENT (OUTPATIENT)
Dept: RHEUMATOLOGY | Facility: CLINIC | Age: 35
End: 2025-06-05
Payer: COMMERCIAL

## 2025-06-05 DIAGNOSIS — R70.0 ELEVATED ERYTHROCYTE SEDIMENTATION RATE: ICD-10-CM

## 2025-06-05 DIAGNOSIS — R76.8 OTHER SPECIFIED ABNORMAL IMMUNOLOGICAL FINDINGS IN SERUM: ICD-10-CM

## 2025-06-05 DIAGNOSIS — I73.00 RAYNAUD'S SYNDROME W/OUT GANGRENE: ICD-10-CM

## 2025-06-05 PROCEDURE — G2211 COMPLEX E/M VISIT ADD ON: CPT | Mod: 95

## 2025-06-05 PROCEDURE — 99213 OFFICE O/P EST LOW 20 MIN: CPT | Mod: 95

## 2025-06-26 ENCOUNTER — NON-APPOINTMENT (OUTPATIENT)
Age: 35
End: 2025-06-26

## 2025-08-28 ENCOUNTER — TRANSCRIPTION ENCOUNTER (OUTPATIENT)
Age: 35
End: 2025-08-28